# Patient Record
Sex: MALE | Race: WHITE | Employment: OTHER | URBAN - NONMETROPOLITAN AREA
[De-identification: names, ages, dates, MRNs, and addresses within clinical notes are randomized per-mention and may not be internally consistent; named-entity substitution may affect disease eponyms.]

---

## 2018-10-06 ENCOUNTER — HOSPITAL ENCOUNTER (EMERGENCY)
Age: 73
Discharge: HOME | End: 2018-10-06
Attending: EMERGENCY MEDICINE
Payer: MEDICARE

## 2018-10-06 ENCOUNTER — APPOINTMENT (OUTPATIENT)
Dept: GENERAL RADIOLOGY | Age: 73
End: 2018-10-06
Payer: MEDICARE

## 2018-10-06 ENCOUNTER — APPOINTMENT (OUTPATIENT)
Dept: CT IMAGING | Age: 73
End: 2018-10-06
Payer: MEDICARE

## 2018-10-06 VITALS
BODY MASS INDEX: 26.41 KG/M2 | WEIGHT: 195 LBS | OXYGEN SATURATION: 93 % | SYSTOLIC BLOOD PRESSURE: 125 MMHG | HEART RATE: 83 BPM | HEIGHT: 72 IN | RESPIRATION RATE: 20 BRPM | TEMPERATURE: 97.6 F | DIASTOLIC BLOOD PRESSURE: 81 MMHG

## 2018-10-06 DIAGNOSIS — R07.89 CHEST WALL PAIN: Primary | ICD-10-CM

## 2018-10-06 LAB
ALBUMIN SERPL-MCNC: 3.9 G/DL (ref 3.5–5.2)
ALP BLD-CCNC: 99 U/L (ref 40–130)
ALT SERPL-CCNC: 15 U/L (ref 5–41)
ANION GAP SERPL CALCULATED.3IONS-SCNC: 11 MMOL/L (ref 7–19)
APTT: 28.6 SEC (ref 26–36.2)
AST SERPL-CCNC: 17 U/L (ref 5–40)
BASOPHILS ABSOLUTE: 0.1 K/UL (ref 0–0.2)
BASOPHILS RELATIVE PERCENT: 0.6 % (ref 0–1)
BILIRUB SERPL-MCNC: 0.3 MG/DL (ref 0.2–1.2)
BUN BLDV-MCNC: 23 MG/DL (ref 8–23)
CALCIUM SERPL-MCNC: 9 MG/DL (ref 8.8–10.2)
CHLORIDE BLD-SCNC: 104 MMOL/L (ref 98–111)
CO2: 25 MMOL/L (ref 22–29)
CREAT SERPL-MCNC: 1.5 MG/DL (ref 0.5–1.2)
EOSINOPHILS ABSOLUTE: 0.4 K/UL (ref 0–0.6)
EOSINOPHILS RELATIVE PERCENT: 5 % (ref 0–5)
GFR NON-AFRICAN AMERICAN: 46
GLUCOSE BLD-MCNC: 115 MG/DL (ref 74–109)
HCT VFR BLD CALC: 34.4 % (ref 42–52)
HEMOGLOBIN: 11.3 G/DL (ref 14–18)
INR BLD: 1.02 (ref 0.88–1.18)
LYMPHOCYTES ABSOLUTE: 1.7 K/UL (ref 1.1–4.5)
LYMPHOCYTES RELATIVE PERCENT: 20.7 % (ref 20–40)
MCH RBC QN AUTO: 30.5 PG (ref 27–31)
MCHC RBC AUTO-ENTMCNC: 32.8 G/DL (ref 33–37)
MCV RBC AUTO: 92.7 FL (ref 80–94)
MONOCYTES ABSOLUTE: 0.8 K/UL (ref 0–0.9)
MONOCYTES RELATIVE PERCENT: 9.1 % (ref 0–10)
NEUTROPHILS ABSOLUTE: 5.4 K/UL (ref 1.5–7.5)
NEUTROPHILS RELATIVE PERCENT: 64.4 % (ref 50–65)
PDW BLD-RTO: 13.2 % (ref 11.5–14.5)
PLATELET # BLD: 156 K/UL (ref 130–400)
PMV BLD AUTO: 10.3 FL (ref 9.4–12.4)
POTASSIUM SERPL-SCNC: 4.6 MMOL/L (ref 3.5–5)
PROTHROMBIN TIME: 13.3 SEC (ref 12–14.6)
RBC # BLD: 3.71 M/UL (ref 4.7–6.1)
SODIUM BLD-SCNC: 140 MMOL/L (ref 136–145)
TOTAL PROTEIN: 6.8 G/DL (ref 6.6–8.7)
TROPONIN: <0.01 NG/ML (ref 0–0.03)
WBC # BLD: 8.3 K/UL (ref 4.8–10.8)

## 2018-10-06 PROCEDURE — 85610 PROTHROMBIN TIME: CPT

## 2018-10-06 PROCEDURE — 85025 COMPLETE CBC W/AUTO DIFF WBC: CPT

## 2018-10-06 PROCEDURE — 6360000002 HC RX W HCPCS: Performed by: EMERGENCY MEDICINE

## 2018-10-06 PROCEDURE — 36415 COLL VENOUS BLD VENIPUNCTURE: CPT

## 2018-10-06 PROCEDURE — 99285 EMERGENCY DEPT VISIT HI MDM: CPT | Performed by: EMERGENCY MEDICINE

## 2018-10-06 PROCEDURE — 80053 COMPREHEN METABOLIC PANEL: CPT

## 2018-10-06 PROCEDURE — 74150 CT ABDOMEN W/O CONTRAST: CPT

## 2018-10-06 PROCEDURE — 99285 EMERGENCY DEPT VISIT HI MDM: CPT

## 2018-10-06 PROCEDURE — 71045 X-RAY EXAM CHEST 1 VIEW: CPT

## 2018-10-06 PROCEDURE — 84484 ASSAY OF TROPONIN QUANT: CPT

## 2018-10-06 PROCEDURE — 85730 THROMBOPLASTIN TIME PARTIAL: CPT

## 2018-10-06 PROCEDURE — 93005 ELECTROCARDIOGRAM TRACING: CPT

## 2018-10-06 PROCEDURE — 96372 THER/PROPH/DIAG INJ SC/IM: CPT

## 2018-10-06 RX ORDER — ORPHENADRINE CITRATE 100 MG/1
100 TABLET, EXTENDED RELEASE ORAL 2 TIMES DAILY
Qty: 20 TABLET | Refills: 0 | Status: SHIPPED | OUTPATIENT
Start: 2018-10-06 | End: 2018-10-16

## 2018-10-06 RX ORDER — ORPHENADRINE CITRATE 30 MG/ML
60 INJECTION INTRAMUSCULAR; INTRAVENOUS ONCE
Status: COMPLETED | OUTPATIENT
Start: 2018-10-06 | End: 2018-10-06

## 2018-10-06 RX ADMIN — ORPHENADRINE CITRATE 60 MG: 30 INJECTION INTRAMUSCULAR; INTRAVENOUS at 21:25

## 2018-10-06 ASSESSMENT — ENCOUNTER SYMPTOMS
PHOTOPHOBIA: 0
CONSTIPATION: 0
CHEST TIGHTNESS: 0
SHORTNESS OF BREATH: 0
TROUBLE SWALLOWING: 0
NAUSEA: 0
DIARRHEA: 0
EYE PAIN: 0
SINUS PRESSURE: 0
WHEEZING: 0
BACK PAIN: 0
ABDOMINAL PAIN: 1
COLOR CHANGE: 0
VOMITING: 0

## 2018-10-06 ASSESSMENT — PAIN SCALES - GENERAL: PAINLEVEL_OUTOF10: 10

## 2018-10-06 ASSESSMENT — PAIN DESCRIPTION - LOCATION: LOCATION: CHEST;ABDOMEN

## 2018-10-06 ASSESSMENT — PAIN DESCRIPTION - PAIN TYPE: TYPE: ACUTE PAIN

## 2018-10-06 ASSESSMENT — PAIN DESCRIPTION - ORIENTATION: ORIENTATION: LEFT

## 2018-10-06 ASSESSMENT — PAIN DESCRIPTION - DESCRIPTORS: DESCRIPTORS: SHARP

## 2018-10-07 NOTE — ED PROVIDER NOTES
[facility]  eMERGENCY dEPARTMENT eNCOUnter      Pt Name: Jaime Ruffin  MRN: [id number]  [place] 1945  Date of evaluation: 10/6/2018  Provider: Anupama Mcconnell MD    [address] [place] [address]       Chief Complaint   Patient presents with    Abdominal Pain     Patient c/o LUQ abd pain, states started 30 minutes ago    Chest Pain         HISTORY OF PRESENT ILLNESS   (Location/Symptom, Timing/Onset, Context/Setting, Quality, Duration, Modifying Factors, Severity)  Note limiting factors. Jaime Ruffin is a 72 y.o. male who presents to the emergency department LUQ pain. Started around an hr to 2 hrs ago while he was watching TV. Describes the pain as sharp with some radiation into his left shoulder. Denies any n/v with the pain. No diarrhea or fever. Pain is 9/10. Took a regular ASA PTA w/o relief. HPI    Nursing Notes were reviewed. REVIEW OF SYSTEMS    (2-9 systems for level 4, 10 or more for level 5)     Review of Systems   Constitutional: Negative for fatigue and fever. HENT: Negative for congestion, drooling, sinus pressure and trouble swallowing. Eyes: Negative for photophobia, pain and visual disturbance. Respiratory: Negative for chest tightness, shortness of breath and wheezing. Cardiovascular: Positive for chest pain. Negative for palpitations and leg swelling. Gastrointestinal: Positive for abdominal pain (LUQ). Negative for constipation, diarrhea, nausea and vomiting. Genitourinary: Negative for dysuria, flank pain and urgency. Musculoskeletal: Negative for arthralgias, back pain, myalgias, neck pain and neck stiffness. Skin: Negative for color change, pallor and rash. Neurological: Negative for dizziness, facial asymmetry, speech difficulty, weakness, light-headedness, numbness and headaches. Psychiatric/Behavioral: Negative for agitation, confusion, hallucinations and suicidal ideas. The patient is not nervous/anxious.              PAST MEDICAL HISTORY     Past

## 2018-10-07 NOTE — ED NOTES
ASSESSMENT:    PT ALERT/ORIENTED X4. PUPILS EQUAL/REACTIVE    SKIN:  WARM/DRY PINK CAPILLARY REFILL < 2SECS    CARDIAC:  S1 S2 NOTED Pt c/o left sided sudden sharp pain. LUNGS: CLEAR UPPER AND LOWER LOBES, RESPIRATIONS EVEN/UNLABORED     ABDOMEN: Pt c/o LUQ pain    EXTREMITIES:  BILATERAL DP AND PT AND NO EDEMA NOTED. NO DISTRESS NOTED. SIDE RAILS UP AND CALL LIGHT IN REACH.      Jose Luis Stark RN  10/06/18 [id number]

## 2018-10-08 LAB
EKG P AXIS: -5 DEGREES
EKG P-R INTERVAL: 152 MS
EKG Q-T INTERVAL: 354 MS
EKG QRS DURATION: 82 MS
EKG QTC CALCULATION (BAZETT): 385 MS
EKG T AXIS: 41 DEGREES

## 2021-12-23 ENCOUNTER — TELEPHONE (OUTPATIENT)
Dept: UROLOGY | Facility: CLINIC | Age: 76
End: 2021-12-23

## 2022-02-02 ENCOUNTER — OFFICE VISIT (OUTPATIENT)
Dept: UROLOGY | Age: 77
End: 2022-02-02
Payer: MEDICARE

## 2022-02-02 VITALS — WEIGHT: 192.2 LBS | TEMPERATURE: 97.2 F | BODY MASS INDEX: 26.03 KG/M2 | HEIGHT: 72 IN

## 2022-02-02 DIAGNOSIS — Z87.442 HISTORY OF KIDNEY STONES: Primary | ICD-10-CM

## 2022-02-02 DIAGNOSIS — N28.9 KIDNEY FUNCTION ABNORMAL: ICD-10-CM

## 2022-02-02 LAB
APPEARANCE FLUID: CLEAR
BILIRUBIN, POC: NORMAL
BLOOD URINE, POC: NORMAL
CLARITY, POC: CLEAR
COLOR, POC: YELLOW
GLUCOSE URINE, POC: NORMAL
KETONES, POC: NORMAL
LEUKOCYTE EST, POC: NORMAL
NITRITE, POC: NORMAL
PH, POC: 5.5
PROTEIN, POC: NORMAL
SPECIFIC GRAVITY, POC: 1.02
UROBILINOGEN, POC: 0.2

## 2022-02-02 PROCEDURE — 99204 OFFICE O/P NEW MOD 45 MIN: CPT | Performed by: NURSE PRACTITIONER

## 2022-02-02 PROCEDURE — 81002 URINALYSIS NONAUTO W/O SCOPE: CPT | Performed by: NURSE PRACTITIONER

## 2022-02-02 RX ORDER — FERROUS SULFATE 325(65) MG
TABLET ORAL
COMMUNITY
Start: 2022-01-12

## 2022-02-02 RX ORDER — OMEPRAZOLE 40 MG/1
CAPSULE, DELAYED RELEASE ORAL
COMMUNITY
Start: 2022-01-04

## 2022-02-02 RX ORDER — MELOXICAM 15 MG/1
TABLET ORAL
COMMUNITY
Start: 2022-01-04

## 2022-02-02 ASSESSMENT — ENCOUNTER SYMPTOMS
VOMITING: 0
ABDOMINAL DISTENTION: 0
BACK PAIN: 0
NAUSEA: 0
ABDOMINAL PAIN: 0

## 2022-02-02 NOTE — PROGRESS NOTES
Uche Lugo is a 78 y.o. male who presents today   Chief Complaint   Patient presents with    New Patient     I am here today for kidney stones       Patient is a 44-year-old male presents to clinic today with complaints of worsening renal function. Patient has a known history of kidney stones and presented to the [facility] ED on 11/12/2021 where a CT was obtained. CT did reveal mild right hydroureteronephrosis secondary to a 5 mm calculus in the distal ureter there were also several nonobstructing stones in the left kidney although no ureteral stone in the left ureter. He was admitted for IV fluids. He denies ever passing the stone although his pain did dissipate therefore he decided to leave AMA prior to any intervention or repeat CT. He states that he has not had any further pain and does not believe he has any stones left. He continues to have worsening renal function and that is why he presents to the office today. CMP  12/22/21 Creatinine 1.40     GFR 52   05/19/21 Creatinine 1.43     GFR 51    Past Medical History:   Diagnosis Date    GERD (gastroesophageal reflux disease)     Hypertension        Past Surgical History:   Procedure Laterality Date    CHOLECYSTECTOMY      KNEE SURGERY         Current Outpatient Medications   Medication Sig Dispense Refill    ferrous sulfate (IRON 325) 325 (65 Fe) MG tablet TAKE 1 TABLET BY MOUTH EVERY DAY      omeprazole (PRILOSEC) 40 MG delayed release capsule TAKE 1 CAPSULE BY MOUTH EVERY MORNING 30 MINUTES BEFORE MORNING MEAL      meloxicam (MOBIC) 15 MG tablet TAKE 1 TABLET BY MOUTH EVERY DAY      Multiple Vitamin (MULTI-VITAMINS PO) Take by mouth      lisinopril (PRINIVIL;ZESTRIL) 10 MG tablet Take 10 mg by mouth daily       No current facility-administered medications for this visit.        No Known Allergies    Social History     Socioeconomic History    Marital status:      Spouse name: None    Number of children: None    Years of education: None    Highest education level: None   Occupational History    None   Tobacco Use    Smoking status: Former Smoker    Smokeless tobacco: Never Used   Vaping Use    Vaping Use: Never used   Substance and Sexual Activity    Alcohol use: No    Drug use: No    Sexual activity: None   Other Topics Concern    None   Social History Narrative    None     Social Determinants of Health     Financial Resource Strain:     Difficulty of Paying Living Expenses: Not on file   Food Insecurity:     Worried About Running Out of Food in the Last Year: Not on file    Vinicius of Food in the Last Year: Not on file   Transportation Needs:     Lack of Transportation (Medical): Not on file    Lack of Transportation (Non-Medical): Not on file   Physical Activity:     Days of Exercise per Week: Not on file    Minutes of Exercise per Session: Not on file   Stress:     Feeling of Stress : Not on file   Social Connections:     Frequency of Communication with Friends and Family: Not on file    Frequency of Social Gatherings with Friends and Family: Not on file    Attends Confucianist Services: Not on file    Active Member of [address] [place] [address] or Organizations: Not on file    Attends Club or Organization Meetings: Not on file    Marital Status: Not on file   Intimate Partner Violence:     Fear of Current or Ex-Partner: Not on file    Emotionally Abused: Not on file    Physically Abused: Not on file    Sexually Abused: Not on file   Housing Stability:     Unable to Pay for Housing in the Last Year: Not on file    Number of [place] in the Last Year: Not on file    Unstable Housing in the Last Year: Not on file       No family history on file. REVIEW OF SYSTEMS:  Review of Systems   Constitutional: Negative for chills and fever. Gastrointestinal: Negative for abdominal distention, abdominal pain, nausea and vomiting.    Genitourinary: Negative for difficulty urinating, dysuria, flank pain, frequency, hematuria and urgency. Musculoskeletal: Negative for back pain and gait problem. Psychiatric/Behavioral: Negative for agitation and confusion. PHYSICAL EXAM:  Temp 97.2 °F (36.2 °C) (Temporal)   Ht 6' (1.829 m)   Wt 192 lb 3.2 oz (87.2 kg)   BMI 26.07 kg/m²   Physical Exam  Vitals and nursing note reviewed. Constitutional:       General: He is not in acute distress. Appearance: Normal appearance. He is not ill-appearing. Pulmonary:      Effort: Pulmonary effort is normal. No respiratory distress. Abdominal:      General: There is no distension. Tenderness: There is no abdominal tenderness. There is no right CVA tenderness or left CVA tenderness. Neurological:      Mental Status: He is alert and oriented to person, place, and time. Mental status is at baseline. Psychiatric:         Mood and Affect: Mood normal.         Behavior: Behavior normal.       DATA:    Results for orders placed or performed in visit on 02/02/22   POCT Urinalysis no Micro   Result Value Ref Range    Color, UA YELLOW     Clarity, UA CLEAR     Glucose, UA POC 100mg/dL     Bilirubin, UA NEG     Ketones, UA NEG     Spec Grav, UA 1.025     Blood, UA POC NEG     pH, UA 5.5     Protein, UA POC NEG     Urobilinogen, UA 0.2     Leukocytes, UA NEG     Nitrite, UA NEG     Appearance, Fluid Clear Clear, Slightly Cloudy     IMAGING:  I did review the impression of the CT which did indicate a 5 mm stone obstructing in the right distal ureter with mild to moderate upstream hydroureteronephrosis as well as 5 left nonobstructing renal calculi. 1. History of kidney stones  Patient denies any flank pain whatsoever. He feels like he does not have any further obstructing stone since he is no longer having pain although never visualized him passing the stones. I have offered him a CT today to assess any further stone or stone burden for possible future intervention.   Patient states he has had 30+ years of stones and he does not want any elective intervention at this time nor does he want to ensure no further obstructing stone. He does feel that he has passed all of his stones since he has no further pain. He is also quite upset today that he did not get to see a urologist/physician therefore would like to follow-up with a urologist.  I did recommend a follow-up CT which she was okay with until he checked out today and decided he did not want to follow-up with the physician nor obtain CT.    - POCT Urinalysis no Micro  - CT ABDOMEN PELVIS WO CONTRAST Additional Contrast? None; Future    2. Kidney function abnormal  Worsening renal function likely baseline of creatinine 1.4 GFR 50. We will go ahead and send him to a nephrologist since that is what his chief complaint is today. - External Referral To Nephrology      Orders Placed This Encounter   Procedures    CT ABDOMEN PELVIS WO CONTRAST Additional Contrast? None     For renal stone protocol     Standing Status:   Future     Standing Expiration Date:   2/2/2023     Scheduling Instructions: For renal stone protocol     Order Specific Question:   Additional Contrast?     Answer:   None     Order Specific Question:   Reason for exam:     Answer:   renal colic for stone protocol    External Referral To Nephrology     Referral Priority:   Routine     Referral Type:   Eval and Treat     Referral Reason:   Specialty Services Required     Referred to Provider:   Patti Guidry MD     Requested Specialty:   Nephrology     Number of Visits Requested:   1    POCT Urinalysis no Micro        Return in about 6 months (around 8/2/2022) for with Dr. Tracy Kyle, follow up, CT prior to appt. .    At least 55 minutes were spent on the day of the visit reviewing the patient's past medical records/imaging, speaking face to face with the patient, and charting in the post visit period.     All information inputted into the note by the MA to include chief complaint, past medical history, past surgical history, medications, allergies, social and family history and review of systems has been reviewed and updated as needed by me. EMR Dragon/transcription disclaimer: Much of this documentt is electronic  transcription/translation of spoken language to printed text. The  electronic translation of spoken language may be erroneous, or at times,  nonsensical words or phrases may be inadvertently transcribed.  Although I  have reviewed the document for such errors, some may still exist.

## 2022-06-06 ENCOUNTER — APPOINTMENT (OUTPATIENT)
Dept: GENERAL RADIOLOGY | Facility: HOSPITAL | Age: 77
End: 2022-06-06

## 2022-06-06 ENCOUNTER — HOSPITAL ENCOUNTER (EMERGENCY)
Facility: HOSPITAL | Age: 77
Discharge: HOME | End: 2022-06-06
Admitting: EMERGENCY MEDICINE

## 2022-06-06 VITALS
RESPIRATION RATE: 20 BRPM | WEIGHT: 195 LBS | HEIGHT: 72 IN | TEMPERATURE: 99.8 F | BODY MASS INDEX: 26.41 KG/M2 | HEART RATE: 85 BPM | DIASTOLIC BLOOD PRESSURE: 75 MMHG | OXYGEN SATURATION: 94 % | SYSTOLIC BLOOD PRESSURE: 101 MMHG

## 2022-06-06 DIAGNOSIS — J18.9 PNEUMONIA OF LEFT LOWER LOBE DUE TO INFECTIOUS ORGANISM: Primary | ICD-10-CM

## 2022-06-06 LAB
ALBUMIN SERPL-MCNC: 4.1 G/DL (ref 3.5–5.2)
ALBUMIN/GLOB SERPL: 1.5 G/DL
ALP SERPL-CCNC: 105 U/L (ref 39–117)
ALT SERPL W P-5'-P-CCNC: 13 U/L (ref 1–41)
ANION GAP SERPL CALCULATED.3IONS-SCNC: 10 MMOL/L (ref 5–15)
AST SERPL-CCNC: 18 U/L (ref 1–40)
B PARAPERT DNA SPEC QL NAA+PROBE: NOT DETECTED
B PERT DNA SPEC QL NAA+PROBE: NOT DETECTED
BASOPHILS # BLD AUTO: 0.03 10*3/MM3 (ref 0–0.2)
BASOPHILS NFR BLD AUTO: 0.5 % (ref 0–1.5)
BILIRUB SERPL-MCNC: 0.4 MG/DL (ref 0–1.2)
BILIRUB UR QL STRIP: NEGATIVE
BUN SERPL-MCNC: 23 MG/DL (ref 8–23)
BUN/CREAT SERPL: 13.1 (ref 7–25)
C PNEUM DNA NPH QL NAA+NON-PROBE: NOT DETECTED
CALCIUM SPEC-SCNC: 9.2 MG/DL (ref 8.6–10.5)
CHLORIDE SERPL-SCNC: 104 MMOL/L (ref 98–107)
CLARITY UR: CLEAR
CO2 SERPL-SCNC: 23 MMOL/L (ref 22–29)
COLOR UR: YELLOW
CREAT SERPL-MCNC: 1.75 MG/DL (ref 0.76–1.27)
D-LACTATE SERPL-SCNC: 1 MMOL/L (ref 0.5–2)
DEPRECATED RDW RBC AUTO: 43.7 FL (ref 37–54)
EGFRCR SERPLBLD CKD-EPI 2021: 40.4 ML/MIN/1.73
EOSINOPHIL # BLD AUTO: 0.05 10*3/MM3 (ref 0–0.4)
EOSINOPHIL NFR BLD AUTO: 0.9 % (ref 0.3–6.2)
ERYTHROCYTE [DISTWIDTH] IN BLOOD BY AUTOMATED COUNT: 13.1 % (ref 12.3–15.4)
FLUAV SUBTYP SPEC NAA+PROBE: NOT DETECTED
FLUBV RNA ISLT QL NAA+PROBE: NOT DETECTED
GLOBULIN UR ELPH-MCNC: 2.7 GM/DL
GLUCOSE SERPL-MCNC: 109 MG/DL (ref 65–99)
GLUCOSE UR STRIP-MCNC: NEGATIVE MG/DL
HADV DNA SPEC NAA+PROBE: NOT DETECTED
HCOV 229E RNA SPEC QL NAA+PROBE: NOT DETECTED
HCOV HKU1 RNA SPEC QL NAA+PROBE: NOT DETECTED
HCOV NL63 RNA SPEC QL NAA+PROBE: NOT DETECTED
HCOV OC43 RNA SPEC QL NAA+PROBE: NOT DETECTED
HCT VFR BLD AUTO: 37.1 % (ref 37.5–51)
HETEROPH AB SER QL LA: NEGATIVE
HGB BLD-MCNC: 12.5 G/DL (ref 13–17.7)
HGB UR QL STRIP.AUTO: NEGATIVE
HMPV RNA NPH QL NAA+NON-PROBE: NOT DETECTED
HPIV1 RNA ISLT QL NAA+PROBE: NOT DETECTED
HPIV2 RNA SPEC QL NAA+PROBE: NOT DETECTED
HPIV3 RNA NPH QL NAA+PROBE: NOT DETECTED
HPIV4 P GENE NPH QL NAA+PROBE: NOT DETECTED
IMM GRANULOCYTES # BLD AUTO: 0.02 10*3/MM3 (ref 0–0.05)
IMM GRANULOCYTES NFR BLD AUTO: 0.3 % (ref 0–0.5)
KETONES UR QL STRIP: NEGATIVE
LEUKOCYTE ESTERASE UR QL STRIP.AUTO: NEGATIVE
LYMPHOCYTES # BLD AUTO: 0.5 10*3/MM3 (ref 0.7–3.1)
LYMPHOCYTES NFR BLD AUTO: 8.7 % (ref 19.6–45.3)
M PNEUMO IGG SER IA-ACNC: NOT DETECTED
MCH RBC QN AUTO: 30.6 PG (ref 26.6–33)
MCHC RBC AUTO-ENTMCNC: 33.7 G/DL (ref 31.5–35.7)
MCV RBC AUTO: 90.9 FL (ref 79–97)
MONOCYTES # BLD AUTO: 0.67 10*3/MM3 (ref 0.1–0.9)
MONOCYTES NFR BLD AUTO: 11.6 % (ref 5–12)
NEUTROPHILS NFR BLD AUTO: 4.5 10*3/MM3 (ref 1.7–7)
NEUTROPHILS NFR BLD AUTO: 78 % (ref 42.7–76)
NITRITE UR QL STRIP: NEGATIVE
NRBC BLD AUTO-RTO: 0 /100 WBC (ref 0–0.2)
PH UR STRIP.AUTO: 5.5 [PH] (ref 5–8)
PLATELET # BLD AUTO: 132 10*3/MM3 (ref 140–450)
PMV BLD AUTO: 10.8 FL (ref 6–12)
POTASSIUM SERPL-SCNC: 4.4 MMOL/L (ref 3.5–5.2)
PROCALCITONIN SERPL-MCNC: 0.09 NG/ML (ref 0–0.25)
PROT SERPL-MCNC: 6.8 G/DL (ref 6–8.5)
PROT UR QL STRIP: NEGATIVE
RBC # BLD AUTO: 4.08 10*6/MM3 (ref 4.14–5.8)
RHINOVIRUS RNA SPEC NAA+PROBE: NOT DETECTED
RSV RNA NPH QL NAA+NON-PROBE: NOT DETECTED
S PYO AG THROAT QL: NEGATIVE
SARS-COV-2 RNA NPH QL NAA+NON-PROBE: NOT DETECTED
SODIUM SERPL-SCNC: 137 MMOL/L (ref 136–145)
SP GR UR STRIP: 1.02 (ref 1–1.03)
T3FREE SERPL-MCNC: 2.26 PG/ML (ref 2–4.4)
T4 FREE SERPL-MCNC: 0.93 NG/DL (ref 0.93–1.7)
TROPONIN T SERPL-MCNC: <0.01 NG/ML (ref 0–0.03)
TSH SERPL DL<=0.05 MIU/L-ACNC: 0.51 UIU/ML (ref 0.27–4.2)
UROBILINOGEN UR QL STRIP: NORMAL
WBC NRBC COR # BLD: 5.77 10*3/MM3 (ref 3.4–10.8)

## 2022-06-06 PROCEDURE — 96361 HYDRATE IV INFUSION ADD-ON: CPT

## 2022-06-06 PROCEDURE — 71045 X-RAY EXAM CHEST 1 VIEW: CPT

## 2022-06-06 PROCEDURE — 0202U NFCT DS 22 TRGT SARS-COV-2: CPT | Performed by: PHYSICIAN ASSISTANT

## 2022-06-06 PROCEDURE — 93005 ELECTROCARDIOGRAM TRACING: CPT | Performed by: PHYSICIAN ASSISTANT

## 2022-06-06 PROCEDURE — 80053 COMPREHEN METABOLIC PANEL: CPT | Performed by: PHYSICIAN ASSISTANT

## 2022-06-06 PROCEDURE — 87081 CULTURE SCREEN ONLY: CPT | Performed by: PHYSICIAN ASSISTANT

## 2022-06-06 PROCEDURE — 86308 HETEROPHILE ANTIBODY SCREEN: CPT | Performed by: PHYSICIAN ASSISTANT

## 2022-06-06 PROCEDURE — 84484 ASSAY OF TROPONIN QUANT: CPT | Performed by: PHYSICIAN ASSISTANT

## 2022-06-06 PROCEDURE — 93010 ELECTROCARDIOGRAM REPORT: CPT | Performed by: INTERNAL MEDICINE

## 2022-06-06 PROCEDURE — 84439 ASSAY OF FREE THYROXINE: CPT | Performed by: PHYSICIAN ASSISTANT

## 2022-06-06 PROCEDURE — 87880 STREP A ASSAY W/OPTIC: CPT | Performed by: PHYSICIAN ASSISTANT

## 2022-06-06 PROCEDURE — 99284 EMERGENCY DEPT VISIT MOD MDM: CPT

## 2022-06-06 PROCEDURE — 81003 URINALYSIS AUTO W/O SCOPE: CPT | Performed by: PHYSICIAN ASSISTANT

## 2022-06-06 PROCEDURE — 84481 FREE ASSAY (FT-3): CPT | Performed by: PHYSICIAN ASSISTANT

## 2022-06-06 PROCEDURE — 87040 BLOOD CULTURE FOR BACTERIA: CPT | Performed by: PHYSICIAN ASSISTANT

## 2022-06-06 PROCEDURE — 84145 PROCALCITONIN (PCT): CPT | Performed by: PHYSICIAN ASSISTANT

## 2022-06-06 PROCEDURE — 84443 ASSAY THYROID STIM HORMONE: CPT | Performed by: PHYSICIAN ASSISTANT

## 2022-06-06 PROCEDURE — 36415 COLL VENOUS BLD VENIPUNCTURE: CPT

## 2022-06-06 PROCEDURE — 96360 HYDRATION IV INFUSION INIT: CPT

## 2022-06-06 PROCEDURE — 83605 ASSAY OF LACTIC ACID: CPT | Performed by: PHYSICIAN ASSISTANT

## 2022-06-06 PROCEDURE — 85025 COMPLETE CBC W/AUTO DIFF WBC: CPT | Performed by: PHYSICIAN ASSISTANT

## 2022-06-06 RX ORDER — CLOPIDOGREL BISULFATE 75 MG/1
75 TABLET ORAL DAILY
COMMUNITY

## 2022-06-06 RX ORDER — MELOXICAM 15 MG/1
15 TABLET ORAL DAILY
COMMUNITY

## 2022-06-06 RX ORDER — ASPIRIN 81 MG/1
81 TABLET ORAL DAILY
COMMUNITY

## 2022-06-06 RX ORDER — FERROUS SULFATE 325(65) MG
325 TABLET ORAL
COMMUNITY

## 2022-06-06 RX ORDER — SODIUM CHLORIDE, SODIUM LACTATE, POTASSIUM CHLORIDE, CALCIUM CHLORIDE 600; 310; 30; 20 MG/100ML; MG/100ML; MG/100ML; MG/100ML
125 INJECTION, SOLUTION INTRAVENOUS CONTINUOUS
Status: DISCONTINUED | OUTPATIENT
Start: 2022-06-06 | End: 2022-06-06 | Stop reason: HOSPADM

## 2022-06-06 RX ORDER — DOXYCYCLINE 100 MG/1
100 CAPSULE ORAL 2 TIMES DAILY
Qty: 14 CAPSULE | Refills: 0 | Status: SHIPPED | OUTPATIENT
Start: 2022-06-06

## 2022-06-06 RX ORDER — LISINOPRIL 10 MG/1
10 TABLET ORAL DAILY
COMMUNITY

## 2022-06-06 RX ORDER — MULTIPLE VITAMINS W/ MINERALS TAB 9MG-400MCG
1 TAB ORAL DAILY
COMMUNITY

## 2022-06-06 RX ORDER — OMEPRAZOLE 20 MG/1
20 CAPSULE, DELAYED RELEASE ORAL DAILY
COMMUNITY

## 2022-06-06 RX ORDER — ALLOPURINOL 100 MG/1
100 TABLET ORAL DAILY
COMMUNITY

## 2022-06-06 RX ORDER — SACCHAROMYCES BOULARDII 250 MG
250 CAPSULE ORAL 2 TIMES DAILY
Qty: 14 CAPSULE | Refills: 0 | Status: SHIPPED | OUTPATIENT
Start: 2022-06-06

## 2022-06-06 RX ORDER — ACETAMINOPHEN 500 MG
1000 TABLET ORAL ONCE
Status: COMPLETED | OUTPATIENT
Start: 2022-06-06 | End: 2022-06-06

## 2022-06-06 RX ORDER — AZITHROMYCIN 250 MG/1
250 TABLET, FILM COATED ORAL DAILY
Qty: 6 TABLET | Refills: 0 | Status: SHIPPED | OUTPATIENT
Start: 2022-06-06

## 2022-06-06 RX ADMIN — SODIUM CHLORIDE, POTASSIUM CHLORIDE, SODIUM LACTATE AND CALCIUM CHLORIDE 500 ML: 600; 310; 30; 20 INJECTION, SOLUTION INTRAVENOUS at 09:36

## 2022-06-06 RX ADMIN — SODIUM CHLORIDE, POTASSIUM CHLORIDE, SODIUM LACTATE AND CALCIUM CHLORIDE 125 ML/HR: 600; 310; 30; 20 INJECTION, SOLUTION INTRAVENOUS at 10:52

## 2022-06-06 RX ADMIN — ACETAMINOPHEN 1000 MG: 500 TABLET ORAL at 11:56

## 2022-06-06 NOTE — ED PROVIDER NOTES
Subjective   History of Present Illness    Patient is a pleasant 76-year-old gentleman who presents to ED with wife.  Both are historians.  Chief complaint is fever and not feeling well.  The patient describes his symptoms began yesterday morning.  The day before, he was out about yard-saling without any difficulty.  Yesterday, he felt unwell but continued to proceed with his normal activity.  He did go to [employer].  He was eating per usual but just had generalized body aches.  He describes that he recently got over respiratory infection and sinus pressure this previous week.  He completed antibiotics on Friday.  He denies any new cough or congestion.  He denies any further respiratory symptoms.  The patient denies any changes in bowel movement.  He denies dysuria, hematuria, or flank pain.  This morning, he felt significantly worse.  He spiked a temp of 101.  He was given a baby aspirin.  He denies any specific chest pain but does complain of generalized myalgias and arthralgias.  He did have a stent placed 2 months ago and denies any similar symptoms.  He denies any associated abdominal pain, neck pain, rash, sick exposure.  He did take a COVID test this morning which was negative.  He is concerned he may have flu.  Wife reports that he was so weak they fell over several times this morning without hurting himself.    Review of Systems   Constitutional: Positive for activity change, fatigue and fever. Negative for appetite change.   HENT: Negative.    Respiratory: Negative.  Negative for cough, chest tightness and shortness of breath.    Cardiovascular: Negative.  Negative for chest pain.   Gastrointestinal: Negative for abdominal pain.   Genitourinary: Negative.    Musculoskeletal: Negative.    Skin: Negative.    Neurological: Negative.    Psychiatric/Behavioral: Negative.    All other systems reviewed and are negative.      Past Medical History:   Diagnosis Date   • Coronary artery disease    • GERD  "(gastroesophageal reflux disease)    • Hypertension    • Renal disorder        No Known Allergies    Past Surgical History:   Procedure Laterality Date   • BACK SURGERY     • CHOLECYSTECTOMY     • CORONARY STENT PLACEMENT      X3   • REPLACEMENT TOTAL KNEE Left        History reviewed. No pertinent family history.    Social History     Socioeconomic History   • Marital status:    Tobacco Use   • Smoking status: Former Smoker   • Smokeless tobacco: Former User     Types: Chew   Substance and Sexual Activity   • Alcohol use: Never       Prior to Admission medications    Not on File       Medications   lactated ringers infusion (125 mL/hr Intravenous New Bag 6/6/22 1052)   lactated ringers bolus 500 mL (0 mL Intravenous Stopped 6/6/22 1054)   acetaminophen (TYLENOL) tablet 1,000 mg (1,000 mg Oral Given 6/6/22 1156)       /75 (BP Location: Right arm, Patient Position: Lying)   Pulse 84   Temp (!) 101.2 °F (38.4 °C) (Oral)   Resp 18   Ht 182.9 cm (72\")   Wt 88.5 kg (195 lb)   SpO2 93%   BMI 26.45 kg/m²       Objective   Physical Exam  Vitals reviewed.   Constitutional:       Appearance: Normal appearance. He is well-developed.   HENT:      Head: Normocephalic and atraumatic.      Right Ear: External ear normal.      Left Ear: External ear normal.      Nose: Nose normal.   Eyes:      Conjunctiva/sclera: Conjunctivae normal.      Pupils: Pupils are equal, round, and reactive to light.   Neck:      Trachea: No tracheal deviation.   Cardiovascular:      Rate and Rhythm: Normal rate and regular rhythm.      Heart sounds: Normal heart sounds. No murmur heard.    No friction rub. No gallop.   Pulmonary:      Effort: Pulmonary effort is normal. No respiratory distress.      Breath sounds: Normal breath sounds. No wheezing or rales.   Chest:      Chest wall: No tenderness.   Abdominal:      General: Bowel sounds are normal. There is no distension.      Palpations: Abdomen is soft. There is no mass.      " Tenderness: There is no abdominal tenderness. There is no guarding or rebound.   Musculoskeletal:         General: No tenderness or deformity. Normal range of motion.      Cervical back: Normal range of motion and neck supple.   Skin:     General: Skin is warm and dry.      Coloration: Skin is not pale.      Findings: No erythema or rash.   Neurological:      General: No focal deficit present.      Mental Status: He is alert and oriented to person, place, and time.      Sensory: No sensory deficit.      Motor: No weakness.      Comments: Cranial nerve evaluation:  No aphasia.  PERRLA. Normal visual fields. Normal smooth eye movement.   No facial assymetry.  Tongue is midline with normal gag reflex.   Symmetrical/normal sternocleidomastoid movement.   No pronator drift.  No sensory deficits.  No motor deficits.   No ataxia.    Psychiatric:         Mood and Affect: Mood normal.         Behavior: Behavior normal.         Thought Content: Thought content normal.         Judgment: Judgment normal.         Procedures         Lab Results (last 24 hours)     Procedure Component Value Units Date/Time    CBC & Differential [[id number]]  (Abnormal) Collected: 06/06/22 0930    Specimen: Blood Updated: 06/06/22 1003    Narrative:      The following orders were created for panel order CBC & Differential.  Procedure                               Abnormality         Status                     ---------                               -----------         ------                     CBC Auto Differential[[id number]]        Abnormal            Final result                 Please view results for these tests on the individual orders.    Comprehensive Metabolic Panel [[id number]]  (Abnormal) Collected: 06/06/22 0930    Specimen: Blood Updated: 06/06/22 1033     Glucose 109 mg/dL      BUN 23 mg/dL      Creatinine 1.75 mg/dL      Sodium 137 mmol/L      Potassium 4.4 mmol/L      Chloride 104 mmol/L      CO2 23.0 mmol/L      Calcium 9.2 mg/dL       Total Protein 6.8 g/dL      Albumin 4.10 g/dL      ALT (SGPT) 13 U/L      AST (SGOT) 18 U/L      Alkaline Phosphatase 105 U/L      Total Bilirubin 0.4 mg/dL      Globulin 2.7 gm/dL      A/G Ratio 1.5 g/dL      BUN/Creatinine Ratio 13.1     Anion Gap 10.0 mmol/L      eGFR 40.4 mL/min/1.73      Comment: National Kidney Foundation and American Society of Nephrology (ASN) Task Force recommended calculation based on the Chronic Kidney Disease Epidemiology Collaboration (CKD-EPI) equation refit without adjustment for race.       Narrative:      GFR Normal >60  Chronic Kidney Disease <60  Kidney Failure <15      Mononucleosis Screen [[id number]]  (Normal) Collected: 06/06/22 0930    Specimen: Blood Updated: 06/06/22 1007     Monospot Negative    Troponin [[id number]]  (Normal) Collected: 06/06/22 0930    Specimen: Blood Updated: 06/06/22 1029     Troponin T <0.010 ng/mL     Narrative:      Troponin T Reference Range:  <= 0.03 ng/mL-   Negative for AMI  >0.03 ng/mL-     Abnormal for myocardial necrosis.  Clinicians would have to utilize clinical acumen, EKG, Troponin and serial changes to determine if it is an Acute Myocardial Infarction or myocardial injury due to an underlying chronic condition.       Results may be falsely decreased if patient taking Biotin.      Blood Culture - Blood, Arm, Left [[id number]] Collected: 06/06/22 0930    Specimen: Blood from Arm, Left Updated: 06/06/22 1020    Blood Culture - Blood, Arm, Right [[id number]] Collected: 06/06/22 0930    Specimen: Blood from Arm, Right Updated: 06/06/22 1020    Lactic Acid, Plasma [[id number]]  (Normal) Collected: 06/06/22 0930    Specimen: Blood Updated: 06/06/22 1018     Lactate 1.0 mmol/L     Procalcitonin [[id number]]  (Normal) Collected: 06/06/22 0930    Specimen: Blood Updated: 06/06/22 1036     Procalcitonin 0.09 ng/mL     Narrative:      As a Marker for Sepsis (Non-Neonates):    1. <0.5 ng/mL represents a low risk of severe sepsis and/or septic shock.  2.  ">2 ng/mL represents a high risk of severe sepsis and/or septic shock.    As a Marker for Lower Respiratory Tract Infections that require antibiotic therapy:    PCT on Admission    Antibiotic Therapy       6-12 Hrs later    >0.5                Strongly Recommended  >0.25 - <0.5        Recommended   0.1 - 0.25          Discouraged              Remeasure/reassess PCT  <0.1                Strongly Discouraged     Remeasure/reassess PCT    As 28 day mortality risk marker: \"Change in Procalcitonin Result\" (>80% or <=80%) if Day 0 (or Day 1) and Day 4 values are available. Refer to http://[email or web address]-[email or web address]-[email or web address]    Change in PCT <=80%  A decrease of PCT levels below or equal to 80% defines a positive change in PCT test result representing a higher risk for 28-day all-cause mortality of patients diagnosed with severe sepsis for septic shock.    Change in PCT >80%  A decrease of PCT levels of more than 80% defines a negative change in PCT result representing a lower risk for 28-day all-cause mortality of patients diagnosed with severe sepsis or septic shock.       TSH [[id number]]  (Normal) Collected: 06/06/22 0930    Specimen: Blood Updated: 06/06/22 1036     TSH 0.511 uIU/mL     T4, Free [[id number]]  (Normal) Collected: 06/06/22 0930    Specimen: Blood Updated: 06/06/22 1035     Free T4 0.93 ng/dL     Narrative:      Results may be falsely increased if patient taking Biotin.      T3, Free [[id number]] Collected: 06/06/22 0930    Specimen: Blood Updated: 06/06/22 0953    CBC Auto Differential [[id number]]  (Abnormal) Collected: 06/06/22 0930    Specimen: Blood Updated: 06/06/22 1003     WBC 5.77 10*3/mm3      RBC 4.08 10*6/mm3      Hemoglobin 12.5 g/dL      Hematocrit 37.1 %      MCV 90.9 fL      MCH 30.6 pg      MCHC 33.7 g/dL      RDW 13.1 %      RDW-SD 43.7 fl      MPV 10.8 fL      Platelets 132 10*3/mm3      Neutrophil % 78.0 %      Lymphocyte % 8.7 %      Monocyte % 11.6 %      Eosinophil % 0.9 %      Basophil % 0.5 " %      Immature Grans % 0.3 %      Neutrophils, Absolute 4.50 10*3/mm3      Lymphocytes, Absolute 0.50 10*3/mm3      Monocytes, Absolute 0.67 10*3/mm3      Eosinophils, Absolute 0.05 10*3/mm3      Basophils, Absolute 0.03 10*3/mm3      Immature Grans, Absolute 0.02 10*3/mm3      nRBC 0.0 /100 WBC     Respiratory Panel PCR w/COVID-19(SARS-CoV-2) [facility]ESU/NAIMA/BRENNEN/PAD/COR/MAD/CHEYENNE In-House, NP Swab in UTM/VTM, 3-4 HR TAT - Swab, Nasopharynx [[id number]]  (Normal) Collected: 06/06/22 0934    Specimen: Swab from Nasopharynx Updated: 06/06/22 1106     ADENOVIRUS, PCR Not Detected     Coronavirus 229E Not Detected     Coronavirus HKU1 Not Detected     Coronavirus NL63 Not Detected     Coronavirus OC43 Not Detected     COVID19 Not Detected     Human Metapneumovirus Not Detected     Human Rhinovirus/Enterovirus Not Detected     Influenza A PCR Not Detected     Influenza B PCR Not Detected     Parainfluenza Virus 1 Not Detected     Parainfluenza Virus 2 Not Detected     Parainfluenza Virus 3 Not Detected     Parainfluenza Virus 4 Not Detected     RSV, PCR Not Detected     Bordetella pertussis pcr Not Detected     Bordetella parapertussis PCR Not Detected     Chlamydophila pneumoniae PCR Not Detected     Mycoplasma pneumo by PCR Not Detected    Narrative:      In the setting of a positive respiratory panel with a viral infection PLUS a negative procalcitonin without other underlying concern for bacterial infection, consider observing off antibiotics or discontinuation of antibiotics and continue supportive care. If the respiratory panel is positive for atypical bacterial infection (Bordetella pertussis, Chlamydophila pneumoniae, or Mycoplasma pneumoniae), consider antibiotic de-escalation to target atypical bacterial infection.    Rapid Strep A Screen - Swab, Throat [[id number]]  (Normal) Collected: 06/06/22 0934    Specimen: Swab from Throat Updated: 06/06/22 1025     Strep A Ag Negative    Beta Strep Culture, Throat - Swab, Throat  [[id number]] Collected: 06/06/22 0934    Specimen: Swab from Throat Updated: 06/06/22 1025    Urinalysis With Culture If Indicated - Urine, Clean Catch [[id number]]  (Normal) Collected: 06/06/22 1028    Specimen: Urine, Clean Catch Updated: 06/06/22 1056     Color, UA Yellow     Appearance, UA Clear     pH, UA 5.5     Specific Gravity, UA 1.020     Glucose, UA Negative     Ketones, UA Negative     Bilirubin, UA Negative     Blood, UA Negative     Protein, UA Negative     Leuk Esterase, UA Negative     Nitrite, UA Negative     Urobilinogen, UA 0.2 E.U./dL    Narrative:      In absence of clinical symptoms, the presence of pyuria, bacteria, and/or nitrites on the urinalysis result does not correlate with infection.  Urine microscopic not indicated.          XR Chest 1 View    Result Date: 6/6/2022  Narrative: EXAMINATION:  XR CHEST 1 VW-  6/6/2022 9:32 AM CDT  HISTORY: Fever.  COMPARISON: No comparison study.  TECHNIQUE: Single view AP image.  FINDINGS:  The inspiration is not very deep. There is patchy linear infiltrate at the left lung base. There is minimal blunting of the left costophrenic angle. There is mild bronchial wall thickening. Heart size is normal. There are degenerative changes of the shoulders left greater than right. No acute bony abnormality is seen.      Impression: 1. Hypoventilation with vascular crowding. 2. Linear patchy infiltrate along the left hemidiaphragm, likely atelectasis. Pneumonia less likely. Minimal blunting of the left costophrenic angle.   This report was finalized on 06/06/2022 09:37 by Dr. Devan Burks MD.      ED Course  ED Course as of 06/06/22 1335   Mon Jun 06, 2022   1331 Patient has been reassessed on several occasions.  Dr. Siddiqui had spoken to the patient about his unremarkable studies at this time.  Chest x-ray did show questionable possible pneumonia in which we will treat.  He does feel comfortable be discharged.  He is aware that they still possibly could have COVID-19  and advised to recheck while symptomatic in the next several days.  Patient voiced understanding.  Recommend quarantine.  Recommend follow-up primary care provider tomorrow.  Strict return precaution advised.  Patient will be discharged in stable condition. [TK]      ED Course User Index  [TK] Dimple Cevallos PA          [facility]    Final diagnoses:   Pneumonia of left lower lobe due to infectious organism          Dimple Cevallos PA  06/06/22 7051

## 2022-06-06 NOTE — ED TRIAGE NOTES
Patient states around 0200 Sunday morning he started having body aches and general illness. Patient states he has body aches and hurts all over, difficulty urinating but has already being treated by nephrology for that issue. Patient states he has had fever but took a covid test this morning and tested negative. Patient fell this morning due to weakness, denies dizziness or syncope. Denies LOC.

## 2022-06-08 LAB
BACTERIA SPEC AEROBE CULT: NORMAL
QT INTERVAL: 364 MS
QTC INTERVAL: 455 MS

## 2022-06-11 LAB
BACTERIA SPEC AEROBE CULT: NORMAL
BACTERIA SPEC AEROBE CULT: NORMAL

## 2024-04-12 NOTE — PROGRESS NOTES
Subjective    Mr. Lazar is 78 y.o. male    Chief Complaint: Prostate Pain     History of Present Illness  Patient is a 78-year-old gentleman who is referred for according to the note for burning or discomfort with urinating, blood in the semen and frequency.  Patient states he is really not having burning with urination it is more rectal pressure patient states it feels like his prostate is inflamed and swollen and pelvic pain.  Also for last 2 to 3 weeks he has noticed blood in the semen which is correlated with the symptoms he is having.  Denies any new or worsening voiding symptoms his stream and flow are strong no issues with emptying his bladder no gross hematuria.  No fever or chills.  Most recent PSA done March 2024 2.27.    The following portions of the patient's history were reviewed and updated as appropriate: allergies, current medications, past family history, past medical history, past social history, past surgical history and problem list.    Review of Systems   Genitourinary:  Positive for frequency and urgency.        Blood in the semen         Current Outpatient Medications:     allopurinol (ZYLOPRIM) 100 MG tablet, Take 1 tablet by mouth Daily., Disp: , Rfl:     apixaban (Eliquis) 5 MG tablet tablet, Every 12 (Twelve) Hours., Disp: , Rfl:     aspirin 81 MG EC tablet, Take 1 tablet by mouth Daily., Disp: , Rfl:     ferrous sulfate 325 (65 FE) MG tablet, Take 1 tablet by mouth Daily With Breakfast., Disp: , Rfl:     lisinopril (PRINIVIL,ZESTRIL) 10 MG tablet, Take 1 tablet by mouth Daily., Disp: , Rfl:     metoprolol succinate XL (TOPROL-XL) 25 MG 24 hr tablet, metoprolol succinate ER 25 mg tablet,extended release 24 hr  TAKE 1 TABLET BY MOUTH EVERY DAY, Disp: , Rfl:     multivitamin with minerals (CENTRUM SILVER 50+MEN PO), Take 1 tablet by mouth Daily., Disp: , Rfl:     nitroglycerin (NITROSTAT) 0.4 MG SL tablet, nitroglycerin 0.4 mg sublingual tablet  PLACE 1 TABLET UNDER TONGUE EVERY 5 MINS, UP  "TO 3 DOSES AS NEEDED FOR CHEST PAIN, Disp: , Rfl:     omeprazole (priLOSEC) 20 MG capsule, Take 1 capsule by mouth Daily., Disp: , Rfl:     azithromycin (ZITHROMAX) 250 MG tablet, Take 1 tablet by mouth Daily. (Patient not taking: Reported on 4/24/2024), Disp: 6 tablet, Rfl: 0    clopidogrel (PLAVIX) 75 MG tablet, Take 75 mg by mouth Daily. (Patient not taking: Reported on 4/24/2024), Disp: , Rfl:     doxycycline (MONODOX) 100 MG capsule, Take 1 capsule by mouth 2 (Two) Times a Day. (Patient not taking: Reported on 4/24/2024), Disp: 14 capsule, Rfl: 0    meloxicam (MOBIC) 15 MG tablet, Take 15 mg by mouth Daily. (Patient not taking: Reported on 4/24/2024), Disp: , Rfl:     metoprolol tartrate (LOPRESSOR) 25 MG tablet, Take 25 mg by mouth 2 (Two) Times a Day. (Patient not taking: Reported on 4/24/2024), Disp: , Rfl:     saccharomyces boulardii (FLORASTOR) 250 MG capsule, Take 1 capsule by mouth 2 (Two) Times a Day. (Patient not taking: Reported on 4/24/2024), Disp: 14 capsule, Rfl: 0    sulfamethoxazole-trimethoprim (Bactrim DS) 800-160 MG per tablet, Take 1 tablet by mouth 2 (Two) Times a Day for 28 days., Disp: 56 tablet, Rfl: 0    tamsulosin (FLOMAX) 0.4 MG capsule 24 hr capsule, Take 1 capsule by mouth Every Night for 90 days., Disp: 30 capsule, Rfl: 2    Past Medical History:   Diagnosis Date    Coronary artery disease     GERD (gastroesophageal reflux disease)     Hypertension     Renal disorder        Past Surgical History:   Procedure Laterality Date    BACK SURGERY      CHOLECYSTECTOMY      CORONARY STENT PLACEMENT      X3    REPLACEMENT TOTAL KNEE Left        Social History     Socioeconomic History    Marital status:    Tobacco Use    Smoking status: Former    Smokeless tobacco: Former     Types: Chew   Substance and Sexual Activity    Alcohol use: Never       History reviewed. No pertinent family history.    Objective    Temp 97.6 °F (36.4 °C)   Ht 182.9 cm (72\")   Wt 87.3 kg (192 lb 6.4 oz)   " BMI 26.09 kg/m²     Physical Exam  Constitutional:       Appearance: Normal appearance.   HENT:      Head: Normocephalic and atraumatic.   Pulmonary:      Effort: Pulmonary effort is normal.   Genitourinary:     Comments: Left upper pole prostate nodule otherwise smooth symmetric no suspicious lesions nodules asymmetry of firmness palpated.  Skin:     Coloration: Skin is not pale.   Neurological:      Mental Status: He is alert.   Psychiatric:         Mood and Affect: Mood normal.         Behavior: Behavior normal.             Results for orders placed or performed in visit on 04/24/24   POC Urinalysis Dipstick, Multipro    Specimen: Urine   Result Value Ref Range    Color Yellow Yellow, Straw, Dark Yellow, Maegan    Clarity, UA Clear Clear    Glucose, UA Negative Negative mg/dL    Bilirubin Negative Negative    Ketones, UA Negative Negative    Specific Gravity  1.025 1.005 - 1.030    Blood, UA Negative Negative    pH, Urine 5.5 5.0 - 8.0    Protein, POC Negative Negative mg/dL    Urobilinogen, UA 0.2 E.U./dL Normal, 0.2 E.U./dL    Nitrite, UA Negative Negative    Leukocytes Negative Negative     IPSS Questionnaire (AUA-7):  Incomplete emptying  Over the past month, how often have you had a sensation of not emptying your bladder completely after you finished urinating?: About half the time (04/24/24 0938)  Frequency  Over the past month, how often have you had to urinate again less than two hours after you finishied urinating ?: More than half the time (04/24/24 0938)  Intermittency  Over the past month, how often have you found you stopped and started again several time when you urinated ?: Less than 1 time in 5 (04/24/24 0938)  Urgency  Over the last month, how often have you found it difficult  have you found it difficult to postpone urination ?: About half the time (04/24/24 0938)  Weak Stream  Over the past month, how often have you had a weak urinary stream ?: About half the time (04/24/24  0938)  Straining  Over the past month, how often have you had to push or strain to begin urination ?: About half the time (04/24/24 0938)  Nocturia  Over the past month, how many times did you most typically get up to urinate from the time you went to bed until the time you got up in the morning ?: 2 times (04/24/24 0938)  Quality of life due to urinary symptoms  If you were to spend the rest of your life with your urinary condition the way it is now, how would feel about that?: Mixed - about equally satisfied (04/24/24 0938)    Assessment and Plan    Diagnoses and all orders for this visit:    1. Prostatitis, unspecified prostatitis type (Primary)  -     POC Urinalysis Dipstick, Multipro  -     sulfamethoxazole-trimethoprim (Bactrim DS) 800-160 MG per tablet; Take 1 tablet by mouth 2 (Two) Times a Day for 28 days.  Dispense: 56 tablet; Refill: 0  -     tamsulosin (FLOMAX) 0.4 MG capsule 24 hr capsule; Take 1 capsule by mouth Every Night for 90 days.  Dispense: 30 capsule; Refill: 2    2. Hematospermia  -     sulfamethoxazole-trimethoprim (Bactrim DS) 800-160 MG per tablet; Take 1 tablet by mouth 2 (Two) Times a Day for 28 days.  Dispense: 56 tablet; Refill: 0  -     tamsulosin (FLOMAX) 0.4 MG capsule 24 hr capsule; Take 1 capsule by mouth Every Night for 90 days.  Dispense: 30 capsule; Refill: 2      Patient's had a few symptoms occurring last 2 to 3 weeks she describes pelvic pressure if sensation of inflamed or swollen prostate no burning with urination no blood in the urine although he has had blood in the semen since the symptoms have recurred.  Digital rectal exam did reveal a mildly tender prostate there is a prostate nodule in the left upper part of the prostate the patient is aware of but has had for years.  His most recent PSA done last month was 2.27.    Will treat for suspected prostatitis which in turn hopefully will resolve the blood in the semen I also started patient on tamsulosin.

## 2024-04-24 ENCOUNTER — OFFICE VISIT (OUTPATIENT)
Dept: UROLOGY | Facility: CLINIC | Age: 79
End: 2024-04-24
Payer: MEDICARE

## 2024-04-24 ENCOUNTER — PATIENT ROUNDING (BHMG ONLY) (OUTPATIENT)
Dept: UROLOGY | Facility: CLINIC | Age: 79
End: 2024-04-24
Payer: MEDICARE

## 2024-04-24 VITALS — WEIGHT: 192.4 LBS | HEIGHT: 72 IN | TEMPERATURE: 97.6 F | BODY MASS INDEX: 26.06 KG/M2

## 2024-04-24 DIAGNOSIS — N41.9 PROSTATITIS, UNSPECIFIED PROSTATITIS TYPE: Primary | ICD-10-CM

## 2024-04-24 DIAGNOSIS — R36.1 HEMATOSPERMIA: ICD-10-CM

## 2024-04-24 LAB
BILIRUB BLD-MCNC: NEGATIVE MG/DL
CLARITY, POC: CLEAR
COLOR UR: YELLOW
GLUCOSE UR STRIP-MCNC: NEGATIVE MG/DL
KETONES UR QL: NEGATIVE
LEUKOCYTE EST, POC: NEGATIVE
NITRITE UR-MCNC: NEGATIVE MG/ML
PH UR: 5.5 [PH] (ref 5–8)
PROT UR STRIP-MCNC: NEGATIVE MG/DL
RBC # UR STRIP: NEGATIVE /UL
SP GR UR: 1.02 (ref 1–1.03)
UROBILINOGEN UR QL: NORMAL

## 2024-04-24 RX ORDER — TAMSULOSIN HYDROCHLORIDE 0.4 MG/1
1 CAPSULE ORAL NIGHTLY
Qty: 30 CAPSULE | Refills: 2 | Status: SHIPPED | OUTPATIENT
Start: 2024-04-24 | End: 2024-07-23

## 2024-04-24 RX ORDER — SULFAMETHOXAZOLE AND TRIMETHOPRIM 800; 160 MG/1; MG/1
1 TABLET ORAL 2 TIMES DAILY
Qty: 56 TABLET | Refills: 0 | Status: SHIPPED | OUTPATIENT
Start: 2024-04-24 | End: 2024-05-22

## 2024-04-24 RX ORDER — NITROGLYCERIN 0.4 MG/1
TABLET SUBLINGUAL
COMMUNITY

## 2024-04-24 RX ORDER — METOPROLOL SUCCINATE 25 MG/1
TABLET, EXTENDED RELEASE ORAL
COMMUNITY

## 2024-04-24 NOTE — PROGRESS NOTES
April 24, 2024    Hello, may I speak with Joe Lazar?    My name is Pippa      I am  with [facility]  [address], [address]  [place] [address] [phone].    Before we get started may I verify your date of birth? 1945    I am calling to officially welcome you to our practice and ask about your recent visit. Is this a good time to talk? yes    Tell me about your visit with us. What things went well?  Yes, it was a good visit.  He's a good tameka.       We're always looking for ways to make our patients' experiences even better. Do you have recommendations on ways we may improve?  no    Overall were you satisfied with your first visit to our practice? yes       I appreciate you taking the time to speak with me today. Is there anything else I can do for you? no      Thank you, and have a great day.

## 2024-05-09 NOTE — PROGRESS NOTES
Subjective    Mr. Lazar is 78 y.o. male    Chief Complaint: Prostatitis    History of Present Illness  Patient presents for 1 month follow-up.  Has had episodes of blood in the semen however also urinary symptoms are not improved.      The following portions of the patient's history were reviewed and updated as appropriate: allergies, current medications, past family history, past medical history, past social history, past surgical history and problem list.    Review of Systems   Genitourinary:  Positive for urgency.         Current Outpatient Medications:     apixaban (Eliquis) 5 MG tablet tablet, Every 12 (Twelve) Hours., Disp: , Rfl:     aspirin 81 MG EC tablet, Take 1 tablet by mouth Daily., Disp: , Rfl:     ferrous sulfate 325 (65 FE) MG tablet, Take 1 tablet by mouth Daily With Breakfast., Disp: , Rfl:     lisinopril (PRINIVIL,ZESTRIL) 10 MG tablet, Take 1 tablet by mouth Daily., Disp: , Rfl:     meloxicam (MOBIC) 15 MG tablet, Take 1 tablet by mouth Daily., Disp: , Rfl:     metoprolol succinate XL (TOPROL-XL) 25 MG 24 hr tablet, metoprolol succinate ER 25 mg tablet,extended release 24 hr  TAKE 1 TABLET BY MOUTH EVERY DAY, Disp: , Rfl:     multivitamin with minerals (CENTRUM SILVER 50+MEN PO), Take 1 tablet by mouth Daily., Disp: , Rfl:     nitroglycerin (NITROSTAT) 0.4 MG SL tablet, nitroglycerin 0.4 mg sublingual tablet  PLACE 1 TABLET UNDER TONGUE EVERY 5 MINS, UP TO 3 DOSES AS NEEDED FOR CHEST PAIN, Disp: , Rfl:     omeprazole (priLOSEC) 20 MG capsule, Take 1 capsule by mouth Daily., Disp: , Rfl:     allopurinol (ZYLOPRIM) 100 MG tablet, Take 1 tablet by mouth Daily. (Patient not taking: Reported on 5/22/2024), Disp: , Rfl:     azithromycin (ZITHROMAX) 250 MG tablet, Take 1 tablet by mouth Daily. (Patient not taking: Reported on 4/24/2024), Disp: 6 tablet, Rfl: 0    clopidogrel (PLAVIX) 75 MG tablet, Take 75 mg by mouth Daily. (Patient not taking: Reported on 4/24/2024), Disp: , Rfl:     doxycycline  "(MONODOX) 100 MG capsule, Take 1 capsule by mouth 2 (Two) Times a Day. (Patient not taking: Reported on 4/24/2024), Disp: 14 capsule, Rfl: 0    metoprolol tartrate (LOPRESSOR) 25 MG tablet, Take 25 mg by mouth 2 (Two) Times a Day. (Patient not taking: Reported on 4/24/2024), Disp: , Rfl:     saccharomyces boulardii (FLORASTOR) 250 MG capsule, Take 1 capsule by mouth 2 (Two) Times a Day. (Patient not taking: Reported on 4/24/2024), Disp: 14 capsule, Rfl: 0    sulfamethoxazole-trimethoprim (Bactrim DS) 800-160 MG per tablet, Take 1 tablet by mouth 2 (Two) Times a Day for 28 days. (Patient not taking: Reported on 5/22/2024), Disp: 56 tablet, Rfl: 0    tamsulosin (FLOMAX) 0.4 MG capsule 24 hr capsule, Take 1 capsule by mouth Every Night for 90 days. (Patient not taking: Reported on 5/22/2024), Disp: 30 capsule, Rfl: 2    Past Medical History:   Diagnosis Date    Coronary artery disease     GERD (gastroesophageal reflux disease)     Hypertension     Renal disorder        Past Surgical History:   Procedure Laterality Date    BACK SURGERY      CHOLECYSTECTOMY      CORONARY STENT PLACEMENT      X3    REPLACEMENT TOTAL KNEE Left        Social History     Socioeconomic History    Marital status:    Tobacco Use    Smoking status: Former    Smokeless tobacco: Former     Types: Chew   Vaping Use    Vaping status: Never Used   Substance and Sexual Activity    Alcohol use: Never    Sexual activity: Defer       History reviewed. No pertinent family history.    Objective    Temp 97.4 °F (36.3 °C)   Ht 182.9 cm (72\")   Wt 86.2 kg (190 lb)   BMI 25.77 kg/m²     Physical Exam  Vitals reviewed.   Constitutional:       Appearance: Normal appearance.   HENT:      Head: Normocephalic and atraumatic.   Pulmonary:      Effort: Pulmonary effort is normal.   Skin:     Coloration: Skin is not pale.   Neurological:      Mental Status: He is alert.   Psychiatric:         Mood and Affect: Mood normal.         Behavior: Behavior normal. "             Results for orders placed or performed in visit on 05/22/24   POC Urinalysis Dipstick, Multipro    Specimen: Urine   Result Value Ref Range    Color Dark Yellow Yellow, Straw, Dark Yellow, Maegan    Clarity, UA Clear Clear    Glucose, UA Negative Negative mg/dL    Bilirubin Negative Negative    Ketones, UA Negative Negative    Specific Gravity  1.030 1.005 - 1.030    Blood, UA Negative Negative    pH, Urine 5.5 5.0 - 8.0    Protein, POC Negative Negative mg/dL    Urobilinogen, UA 0.2 E.U./dL Normal, 0.2 E.U./dL    Nitrite, UA Negative Negative    Leukocytes Negative Negative     Assessment and Plan    Diagnoses and all orders for this visit:    1. Prostatitis, unspecified prostatitis type (Primary)  -     POC Urinalysis Dipstick, Multipro    2. Hematospermia    Patient I saw last month for episodes of hematospermia I treated him with Flomax and antibiotic.  He has had no improvement in symptoms on tamsulosin.  He understands the benign nature of blood in the semen.    However given his continued voiding symptoms and complaints I feel he needs further evaluation of his lower urinary tract with cystoscopy.  I explained that the cystoscopy could consist of putting a flexible scope up the penis through the urethral meatus he does have a lidocaine gel inserted prior to the cystoscopy but it can be uncomfortable.  He will then traverse the scope through the prostate into the bladder.

## 2024-05-22 ENCOUNTER — OFFICE VISIT (OUTPATIENT)
Dept: UROLOGY | Facility: CLINIC | Age: 79
End: 2024-05-22
Payer: MEDICARE

## 2024-05-22 VITALS — WEIGHT: 190 LBS | BODY MASS INDEX: 25.73 KG/M2 | HEIGHT: 72 IN | TEMPERATURE: 97.4 F

## 2024-05-22 DIAGNOSIS — R36.1 HEMATOSPERMIA: ICD-10-CM

## 2024-05-22 DIAGNOSIS — N41.9 PROSTATITIS, UNSPECIFIED PROSTATITIS TYPE: Primary | ICD-10-CM

## 2024-05-22 DIAGNOSIS — R35.0 FREQUENCY OF URINATION: ICD-10-CM

## 2024-05-22 LAB
BILIRUB BLD-MCNC: NEGATIVE MG/DL
CLARITY, POC: CLEAR
COLOR UR: NORMAL
GLUCOSE UR STRIP-MCNC: NEGATIVE MG/DL
KETONES UR QL: NEGATIVE
LEUKOCYTE EST, POC: NEGATIVE
NITRITE UR-MCNC: NEGATIVE MG/ML
PH UR: 5.5 [PH] (ref 5–8)
PROT UR STRIP-MCNC: NEGATIVE MG/DL
RBC # UR STRIP: NEGATIVE /UL
SP GR UR: 1.03 (ref 1–1.03)
UROBILINOGEN UR QL: NORMAL

## 2024-06-07 ENCOUNTER — PROCEDURE VISIT (OUTPATIENT)
Dept: UROLOGY | Facility: CLINIC | Age: 79
End: 2024-06-07
Payer: MEDICARE

## 2024-06-07 DIAGNOSIS — N40.1 BPH WITH URINARY OBSTRUCTION: ICD-10-CM

## 2024-06-07 DIAGNOSIS — N13.8 BPH WITH URINARY OBSTRUCTION: ICD-10-CM

## 2024-06-07 DIAGNOSIS — R39.9 LOWER URINARY TRACT SYMPTOMS (LUTS): Primary | ICD-10-CM

## 2024-06-07 RX ORDER — FINASTERIDE 5 MG/1
5 TABLET, FILM COATED ORAL DAILY
Qty: 90 TABLET | Refills: 3 | Status: SHIPPED | OUTPATIENT
Start: 2024-06-07

## 2024-06-07 NOTE — PROGRESS NOTES
Subjective    Mr. Lazar is 78 y.o. male    Chief Complaint: Hematospermia/LUTS    History of Present Illness  Patient with recurrent hematospermia as well as lower urinary tract symptoms.  He is intolerant of alpha-blocker therapy.  He presents today for cystoscopic examination.  He reports weak stream and a worsening of his urinary symptoms.  His most bothersome complaint is nocturia 2-3 times.    The following portions of the patient's history were reviewed and updated as appropriate: allergies, current medications, past family history, past medical history, past social history, past surgical history and problem list.    Review of Systems      Current Outpatient Medications:     allopurinol (ZYLOPRIM) 100 MG tablet, Take 1 tablet by mouth Daily. (Patient not taking: Reported on 5/22/2024), Disp: , Rfl:     apixaban (Eliquis) 5 MG tablet tablet, Every 12 (Twelve) Hours., Disp: , Rfl:     aspirin 81 MG EC tablet, Take 1 tablet by mouth Daily., Disp: , Rfl:     azithromycin (ZITHROMAX) 250 MG tablet, Take 1 tablet by mouth Daily. (Patient not taking: Reported on 4/24/2024), Disp: 6 tablet, Rfl: 0    clopidogrel (PLAVIX) 75 MG tablet, Take 75 mg by mouth Daily. (Patient not taking: Reported on 4/24/2024), Disp: , Rfl:     doxycycline (MONODOX) 100 MG capsule, Take 1 capsule by mouth 2 (Two) Times a Day. (Patient not taking: Reported on 4/24/2024), Disp: 14 capsule, Rfl: 0    ferrous sulfate 325 (65 FE) MG tablet, Take 1 tablet by mouth Daily With Breakfast., Disp: , Rfl:     lisinopril (PRINIVIL,ZESTRIL) 10 MG tablet, Take 1 tablet by mouth Daily., Disp: , Rfl:     meloxicam (MOBIC) 15 MG tablet, Take 1 tablet by mouth Daily., Disp: , Rfl:     metoprolol succinate XL (TOPROL-XL) 25 MG 24 hr tablet, metoprolol succinate ER 25 mg tablet,extended release 24 hr  TAKE 1 TABLET BY MOUTH EVERY DAY, Disp: , Rfl:     metoprolol tartrate (LOPRESSOR) 25 MG tablet, Take 25 mg by mouth 2 (Two) Times a Day. (Patient not taking:  Reported on 4/24/2024), Disp: , Rfl:     multivitamin with minerals (CENTRUM SILVER 50+MEN PO), Take 1 tablet by mouth Daily., Disp: , Rfl:     nitroglycerin (NITROSTAT) 0.4 MG SL tablet, nitroglycerin 0.4 mg sublingual tablet  PLACE 1 TABLET UNDER TONGUE EVERY 5 MINS, UP TO 3 DOSES AS NEEDED FOR CHEST PAIN, Disp: , Rfl:     omeprazole (priLOSEC) 20 MG capsule, Take 1 capsule by mouth Daily., Disp: , Rfl:     saccharomyces boulardii (FLORASTOR) 250 MG capsule, Take 1 capsule by mouth 2 (Two) Times a Day. (Patient not taking: Reported on 4/24/2024), Disp: 14 capsule, Rfl: 0    tamsulosin (FLOMAX) 0.4 MG capsule 24 hr capsule, Take 1 capsule by mouth Every Night for 90 days. (Patient not taking: Reported on 5/22/2024), Disp: 30 capsule, Rfl: 2    Past Medical History:   Diagnosis Date    Coronary artery disease     GERD (gastroesophageal reflux disease)     Hypertension     Renal disorder        Past Surgical History:   Procedure Laterality Date    BACK SURGERY      CHOLECYSTECTOMY      CORONARY STENT PLACEMENT      X3    REPLACEMENT TOTAL KNEE Left        Social History     Socioeconomic History    Marital status:    Tobacco Use    Smoking status: Former    Smokeless tobacco: Former     Types: Chew   Vaping Use    Vaping status: Never Used   Substance and Sexual Activity    Alcohol use: Never    Sexual activity: Defer       No family history on file.    Objective    There were no vitals taken for this visit.    Physical Exam        Results for orders placed or performed in visit on 06/07/24   POC Urinalysis Dipstick, Multipro    Specimen: Urine   Result Value Ref Range    Color Yellow Yellow, Straw, Dark Yellow, Maegan    Clarity, UA Clear Clear    Glucose, UA Negative Negative mg/dL    Bilirubin Negative Negative    Ketones, UA Negative Negative    Specific Gravity  1.020 1.005 - 1.030    Blood, UA Negative Negative    pH, Urine 5.5 5.0 - 8.0    Protein, POC Negative Negative mg/dL    Urobilinogen, UA Normal  Normal, 0.2 E.U./dL    Nitrite, UA Negative Negative    Leukocytes Negative Negative     Assessment and Plan    Diagnoses and all orders for this visit:    1. Lower urinary tract symptoms (LUTS) (Primary)  -     POC Urinalysis Dipstick, Multipro                Pre- operative diagnosis:  Lower urinary tract symptoms/Hematospermia    Post operative diagnosis:  Same    Procedure:  The patient was prepped and draped in a normal sterile fashion.  The urethra was anesthetized with 2% lidocaine jelly.  A flexible cystoscope was introduced per urethra.      Urethra:  Normal    Bladder:  moderate trabeculation and cellules    Ureteral orifices:  Normal position bilaterally    Prostate:  lateral lobe hypertrophy and median lobe hypertrophy    Patient tolerated the procedure well    Complications: none    Blood loss: minimal    Follow up:    Routine follow up     Discussed surgical procedures as well as medication with finasteride.  After thorough discussion of his options he is opted for finasteride.  He will follow-up with Sameer in 6 months.    Discussion of this resulted in significant evaluation and management in addition to the cystoscopy.

## 2024-07-18 LAB
25(OH)D3 SERPL-MCNC: 45.3 NG/ML
ALBUMIN SERPL-MCNC: 3.9 G/DL (ref 3.5–5.2)
ALP SERPL-CCNC: 106 U/L (ref 40–130)
ALT SERPL-CCNC: 14 U/L (ref 5–41)
ANION GAP SERPL CALCULATED.3IONS-SCNC: 13 MMOL/L (ref 7–19)
AST SERPL-CCNC: 20 U/L (ref 5–40)
BASOPHILS # BLD: 0.1 K/UL (ref 0–0.2)
BASOPHILS NFR BLD: 1.3 % (ref 0–1)
BILIRUB SERPL-MCNC: 0.3 MG/DL (ref 0.2–1.2)
BILIRUB UR QL STRIP: NEGATIVE
BUN SERPL-MCNC: 24 MG/DL (ref 8–23)
CALCIUM SERPL-MCNC: 9.4 MG/DL (ref 8.8–10.2)
CHLORIDE SERPL-SCNC: 109 MMOL/L (ref 98–111)
CLARITY UR: CLEAR
CO2 SERPL-SCNC: 20 MMOL/L (ref 22–29)
COLOR UR: YELLOW
CREAT SERPL-MCNC: 1.6 MG/DL (ref 0.5–1.2)
CREAT UR-MCNC: 171.6 MG/DL (ref 39–259)
EOSINOPHIL # BLD: 0.3 K/UL (ref 0–0.6)
EOSINOPHIL NFR BLD: 5.4 % (ref 0–5)
ERYTHROCYTE [DISTWIDTH] IN BLOOD BY AUTOMATED COUNT: 12.6 % (ref 11.5–14.5)
GLUCOSE SERPL-MCNC: 87 MG/DL (ref 74–109)
GLUCOSE UR STRIP.AUTO-MCNC: NEGATIVE MG/DL
HCT VFR BLD AUTO: 38.8 % (ref 42–52)
HGB BLD-MCNC: 12.6 G/DL (ref 14–18)
HGB UR STRIP.AUTO-MCNC: NEGATIVE MG/L
IMM GRANULOCYTES # BLD: 0 K/UL
KETONES UR STRIP.AUTO-MCNC: NEGATIVE MG/DL
LEUKOCYTE ESTERASE UR QL STRIP.AUTO: NEGATIVE
LYMPHOCYTES # BLD: 1.7 K/UL (ref 1.1–4.5)
LYMPHOCYTES NFR BLD: 32.3 % (ref 20–40)
MAGNESIUM SERPL-MCNC: 1.8 MG/DL (ref 1.6–2.4)
MCH RBC QN AUTO: 30 PG (ref 27–31)
MCHC RBC AUTO-ENTMCNC: 32.5 G/DL (ref 33–37)
MCV RBC AUTO: 92.4 FL (ref 80–94)
MONOCYTES # BLD: 0.5 K/UL (ref 0–0.9)
MONOCYTES NFR BLD: 9.5 % (ref 0–10)
NEUTROPHILS # BLD: 2.8 K/UL (ref 1.5–7.5)
NEUTS SEG NFR BLD: 51.1 % (ref 50–65)
NITRITE UR QL STRIP.AUTO: NEGATIVE
PH UR STRIP.AUTO: 5 [PH] (ref 5–8)
PHOSPHATE SERPL-MCNC: 2.6 MG/DL (ref 2.5–4.5)
PLATELET # BLD AUTO: 174 K/UL (ref 130–400)
PMV BLD AUTO: 10.3 FL (ref 9.4–12.4)
POTASSIUM SERPL-SCNC: 4.6 MMOL/L (ref 3.5–5)
PROT SERPL-MCNC: 6.5 G/DL (ref 6.6–8.7)
PROT UR STRIP.AUTO-MCNC: NEGATIVE MG/DL
PROT UR-MCNC: 9 MG/DL (ref 15–45)
PTH-INTACT SERPL-MCNC: 31.8 PG/ML (ref 15–65)
RBC # BLD AUTO: 4.2 M/UL (ref 4.7–6.1)
SODIUM SERPL-SCNC: 142 MMOL/L (ref 136–145)
SP GR UR STRIP.AUTO: 1.02 (ref 1–1.03)
URATE SERPL-MCNC: 6 MG/DL (ref 3.4–7)
UROBILINOGEN UR STRIP.AUTO-MCNC: 0.2 E.U./DL
WBC # BLD AUTO: 5.4 K/UL (ref 4.8–10.8)

## 2024-08-19 ENCOUNTER — TELEPHONE (OUTPATIENT)
Dept: UROLOGY | Facility: CLINIC | Age: 79
End: 2024-08-19
Payer: MEDICARE

## 2024-08-19 NOTE — TELEPHONE ENCOUNTER
Called pt- his symptoms include getting harder to urinate and it hurts when he urinates. I advised pt he needs appt. He v.u and is coming on Wednesday

## 2024-08-19 NOTE — TELEPHONE ENCOUNTER
Patient called with c.o prostatitis coming back. He is requesting a refill on bactrim. Please advise if pt needs appt

## 2024-08-20 NOTE — PROGRESS NOTES
Subjective    Mr. Lazar is 79 y.o. male    Chief Complaint: Prostatitis    History of Present Illness  Patient is a 79-year-old gentleman with previous history of lower urinary tract symptoms as well as history of prostatitis.  Last few days patient has developed worsening urgency frequency and a little bit of burning.  He denies any other symptoms such as fever chills nausea vomiting or flank pain.  Denies any perineal discomfort.  He states that this feels like previous prostatitis episodes.  His temp is normal today.  He cannot tolerate alpha blockers.  He did have a cystoscopy 06/07/2024 and he would be a surgical candidate these were discussed by Dr. Gallegos with the patient.  For now he just wants to leave things as they are.    The following portions of the patient's history were reviewed and updated as appropriate: allergies, current medications, past family history, past medical history, past social history, past surgical history and problem list.    Review of Systems   Genitourinary:  Positive for dysuria, frequency and urgency.         Current Outpatient Medications:     allopurinol (ZYLOPRIM) 100 MG tablet, Take 1 tablet by mouth Daily., Disp: , Rfl:     apixaban (Eliquis) 5 MG tablet tablet, Every 12 (Twelve) Hours., Disp: , Rfl:     aspirin 81 MG EC tablet, Take 1 tablet by mouth Daily., Disp: , Rfl:     azithromycin (ZITHROMAX) 250 MG tablet, Take 1 tablet by mouth Daily., Disp: 6 tablet, Rfl: 0    clopidogrel (PLAVIX) 75 MG tablet, Take 1 tablet by mouth Daily., Disp: , Rfl:     doxycycline (MONODOX) 100 MG capsule, Take 1 capsule by mouth 2 (Two) Times a Day., Disp: 14 capsule, Rfl: 0    ferrous sulfate 325 (65 FE) MG tablet, Take 1 tablet by mouth Daily With Breakfast., Disp: , Rfl:     finasteride (PROSCAR) 5 MG tablet, Take 1 tablet by mouth Daily., Disp: 90 tablet, Rfl: 3    lisinopril (PRINIVIL,ZESTRIL) 10 MG tablet, Take 1 tablet by mouth Daily., Disp: , Rfl:     meloxicam (MOBIC) 15 MG tablet,  "Take 1 tablet by mouth Daily., Disp: , Rfl:     metoprolol succinate XL (TOPROL-XL) 25 MG 24 hr tablet, metoprolol succinate ER 25 mg tablet,extended release 24 hr  TAKE 1 TABLET BY MOUTH EVERY DAY, Disp: , Rfl:     metoprolol tartrate (LOPRESSOR) 25 MG tablet, Take 1 tablet by mouth 2 (Two) Times a Day., Disp: , Rfl:     multivitamin with minerals (CENTRUM SILVER 50+MEN PO), Take 1 tablet by mouth Daily., Disp: , Rfl:     nitroglycerin (NITROSTAT) 0.4 MG SL tablet, nitroglycerin 0.4 mg sublingual tablet  PLACE 1 TABLET UNDER TONGUE EVERY 5 MINS, UP TO 3 DOSES AS NEEDED FOR CHEST PAIN, Disp: , Rfl:     omeprazole (priLOSEC) 20 MG capsule, Take 1 capsule by mouth Daily., Disp: , Rfl:     saccharomyces boulardii (FLORASTOR) 250 MG capsule, Take 1 capsule by mouth 2 (Two) Times a Day., Disp: 14 capsule, Rfl: 0    sulfamethoxazole-trimethoprim (Bactrim DS) 800-160 MG per tablet, Take 1 tablet by mouth 2 (Two) Times a Day for 28 days., Disp: 56 tablet, Rfl: 0    Past Medical History:   Diagnosis Date    Coronary artery disease     GERD (gastroesophageal reflux disease)     Hypertension     Renal disorder        Past Surgical History:   Procedure Laterality Date    BACK SURGERY      CHOLECYSTECTOMY      CORONARY STENT PLACEMENT      X3    REPLACEMENT TOTAL KNEE Left        Social History     Socioeconomic History    Marital status:    Tobacco Use    Smoking status: Former    Smokeless tobacco: Former     Types: Chew   Vaping Use    Vaping status: Never Used   Substance and Sexual Activity    Alcohol use: Never    Sexual activity: Defer       No family history on file.    Objective    Temp 97.5 °F (36.4 °C)   Ht 182.9 cm (72\")   Wt 85.3 kg (188 lb)   BMI 25.50 kg/m²     Physical Exam  Constitutional:       Appearance: Normal appearance.   HENT:      Head: Normocephalic and atraumatic.   Pulmonary:      Effort: Pulmonary effort is normal.   Skin:     Coloration: Skin is not pale.   Neurological:      Mental " Status: He is alert.   Psychiatric:         Mood and Affect: Mood normal.         Behavior: Behavior normal.             Results for orders placed or performed in visit on 08/21/24   POC Urinalysis Dipstick, Multipro    Specimen: Urine   Result Value Ref Range    Color Yellow Yellow, Straw, Dark Yellow, Maegan    Clarity, UA Clear Clear    Glucose, UA Negative Negative mg/dL    Bilirubin Negative Negative    Ketones, UA Negative Negative    Specific Gravity  1.025 1.005 - 1.030    Blood, UA Negative Negative    pH, Urine 6.5 5.0 - 8.0    Protein, POC Negative Negative mg/dL    Urobilinogen, UA Normal Normal, 0.2 E.U./dL    Nitrite, UA Negative Negative    Leukocytes Negative Negative     Assessment and Plan    Diagnoses and all orders for this visit:    1. Prostatitis, unspecified prostatitis type (Primary)  -     POC Urinalysis Dipstick, Multipro  -     sulfamethoxazole-trimethoprim (Bactrim DS) 800-160 MG per tablet; Take 1 tablet by mouth 2 (Two) Times a Day for 28 days.  Dispense: 56 tablet; Refill: 0      Symptoms suspicious for prostatitis went ahead and treated him with 20 Dorey course of Bactrim which she is taken in the past which was effective.    He can return as scheduled or as needed.

## 2024-08-21 ENCOUNTER — OFFICE VISIT (OUTPATIENT)
Dept: UROLOGY | Facility: CLINIC | Age: 79
End: 2024-08-21
Payer: MEDICARE

## 2024-08-21 VITALS — TEMPERATURE: 97.5 F | HEIGHT: 72 IN | BODY MASS INDEX: 25.47 KG/M2 | WEIGHT: 188 LBS

## 2024-08-21 DIAGNOSIS — N41.9 PROSTATITIS, UNSPECIFIED PROSTATITIS TYPE: Primary | ICD-10-CM

## 2024-08-21 LAB
BILIRUB BLD-MCNC: NEGATIVE MG/DL
CLARITY, POC: CLEAR
COLOR UR: YELLOW
GLUCOSE UR STRIP-MCNC: NEGATIVE MG/DL
KETONES UR QL: NEGATIVE
LEUKOCYTE EST, POC: NEGATIVE
NITRITE UR-MCNC: NEGATIVE MG/ML
PH UR: 6.5 [PH] (ref 5–8)
PROT UR STRIP-MCNC: NEGATIVE MG/DL
RBC # UR STRIP: NEGATIVE /UL
SP GR UR: 1.02 (ref 1–1.03)
UROBILINOGEN UR QL: NORMAL

## 2024-08-21 RX ORDER — SULFAMETHOXAZOLE AND TRIMETHOPRIM 800; 160 MG/1; MG/1
1 TABLET ORAL 2 TIMES DAILY
Qty: 56 TABLET | Refills: 0 | Status: SHIPPED | OUTPATIENT
Start: 2024-08-21 | End: 2024-09-18

## 2024-12-03 NOTE — PROGRESS NOTES
Subjective    Mr. Lazar is 79 y.o. male    Chief Complaint: Prostatitis     History of Present Illness  Patient with history of BPH and prostatitis recurrently from a BPH standpoint symptoms are about the same he is on finasteride he asked about whether he should continue and I said yes.  I also treated him with a 1 month course of Bactrim when I saw him 08/21/2024.  Now also taking cranberry pills and has noticed she has not had any UTI or UTI symptoms since taking that.  His urine is clear.  He did have a cystoscopy done 06/07/2024 for lower urinary tract symptoms he would be candidate for surgical options.  For now he does not want to consider surgery.    The following portions of the patient's history were reviewed and updated as appropriate: allergies, current medications, past family history, past medical history, past social history, past surgical history and problem list.    Review of Systems   Genitourinary:  Positive for difficulty urinating, dysuria, frequency and hematuria. Negative for decreased urine volume, flank pain, penile discharge, penile pain, penile swelling, scrotal swelling, testicular pain and urgency.         Current Outpatient Medications:     allopurinol (ZYLOPRIM) 100 MG tablet, Take 1 tablet by mouth Daily., Disp: , Rfl:     apixaban (Eliquis) 5 MG tablet tablet, Every 12 (Twelve) Hours., Disp: , Rfl:     aspirin 81 MG EC tablet, Take 1 tablet by mouth Daily., Disp: , Rfl:     ferrous sulfate 325 (65 FE) MG tablet, Take 1 tablet by mouth Daily With Breakfast., Disp: , Rfl:     finasteride (PROSCAR) 5 MG tablet, Take 1 tablet by mouth Daily., Disp: 90 tablet, Rfl: 3    lisinopril (PRINIVIL,ZESTRIL) 10 MG tablet, Take 1 tablet by mouth Daily., Disp: , Rfl:     meloxicam (MOBIC) 15 MG tablet, Take 1 tablet by mouth Daily., Disp: , Rfl:     metoprolol succinate XL (TOPROL-XL) 25 MG 24 hr tablet, metoprolol succinate ER 25 mg tablet,extended release 24 hr  TAKE 1 TABLET BY MOUTH EVERY DAY,  "Disp: , Rfl:     multivitamin with minerals (CENTRUM SILVER 50+MEN PO), Take 1 tablet by mouth Daily., Disp: , Rfl:     nitroglycerin (NITROSTAT) 0.4 MG SL tablet, nitroglycerin 0.4 mg sublingual tablet  PLACE 1 TABLET UNDER TONGUE EVERY 5 MINS, UP TO 3 DOSES AS NEEDED FOR CHEST PAIN, Disp: , Rfl:     omeprazole (priLOSEC) 40 MG capsule, Take 1 capsule by mouth Every Morning Before Breakfast., Disp: , Rfl:     saccharomyces boulardii (FLORASTOR) 250 MG capsule, Take 1 capsule by mouth 2 (Two) Times a Day., Disp: 14 capsule, Rfl: 0    azithromycin (ZITHROMAX) 250 MG tablet, Take 1 tablet by mouth Daily., Disp: 6 tablet, Rfl: 0    doxycycline (MONODOX) 100 MG capsule, Take 1 capsule by mouth 2 (Two) Times a Day., Disp: 14 capsule, Rfl: 0    metoprolol tartrate (LOPRESSOR) 25 MG tablet, Take 1 tablet by mouth 2 (Two) Times a Day., Disp: , Rfl:     Past Medical History:   Diagnosis Date    Coronary artery disease     GERD (gastroesophageal reflux disease)     Hypertension     Renal disorder        Past Surgical History:   Procedure Laterality Date    BACK SURGERY      CHOLECYSTECTOMY      CORONARY STENT PLACEMENT      X3    REPLACEMENT TOTAL KNEE Left        Social History     Socioeconomic History    Marital status:    Tobacco Use    Smoking status: Former    Smokeless tobacco: Former     Types: Chew   Vaping Use    Vaping status: Never Used   Substance and Sexual Activity    Alcohol use: Never    Sexual activity: Defer       No family history on file.    Objective    Temp 97.5 °F (36.4 °C)   Ht 182.9 cm (72\")   Wt 86.2 kg (190 lb)   BMI 25.77 kg/m²     Physical Exam  Vitals reviewed.   Constitutional:       Appearance: Normal appearance.   HENT:      Head: Normocephalic and atraumatic.   Pulmonary:      Effort: Pulmonary effort is normal.   Neurological:      Mental Status: He is alert.   Psychiatric:         Mood and Affect: Mood normal.         Behavior: Behavior normal.             Results for orders " placed or performed in visit on 12/09/24   POC Urinalysis Dipstick, Multipro    Collection Time: 12/09/24  1:11 PM    Specimen: Urine   Result Value Ref Range    Color Yellow Yellow, Straw, Dark Yellow, Maegan    Clarity, UA Clear Clear    Glucose, UA Negative Negative mg/dL    Bilirubin Negative Negative    Ketones, UA Negative Negative    Specific Gravity  1.025     Blood, UA Negative Negative    pH, Urine 5.5     Protein, POC Negative Negative mg/dL    Urobilinogen, UA 0.2 E.U./dL Normal, 0.2 E.U./dL    Nitrite, UA Negative Negative    Leukocytes Negative Negative     Assessment and Plan    Diagnoses and all orders for this visit:    1. Prostatitis, unspecified prostatitis type (Primary)  -     POC Urinalysis Dipstick, Multipro    2. BPH with urinary obstruction      No Current prostatitis symptoms his urine is clear.  He is taking cranberry pills and believes this has helped with preventing infections.    He will continue finasteride for now although symptoms are unchanged he does not want wish to consider surgery at this time.    Follow-up 6 months.

## 2024-12-09 ENCOUNTER — OFFICE VISIT (OUTPATIENT)
Dept: UROLOGY | Facility: CLINIC | Age: 79
End: 2024-12-09
Payer: MEDICARE

## 2024-12-09 VITALS — TEMPERATURE: 97.5 F | HEIGHT: 72 IN | WEIGHT: 190 LBS | BODY MASS INDEX: 25.73 KG/M2

## 2024-12-09 DIAGNOSIS — N41.9 PROSTATITIS, UNSPECIFIED PROSTATITIS TYPE: Primary | ICD-10-CM

## 2024-12-09 DIAGNOSIS — N13.8 BPH WITH URINARY OBSTRUCTION: ICD-10-CM

## 2024-12-09 DIAGNOSIS — N40.1 BPH WITH URINARY OBSTRUCTION: ICD-10-CM

## 2024-12-09 LAB
BILIRUB BLD-MCNC: NEGATIVE MG/DL
CLARITY, POC: CLEAR
COLOR UR: YELLOW
GLUCOSE UR STRIP-MCNC: NEGATIVE MG/DL
KETONES UR QL: NEGATIVE
LEUKOCYTE EST, POC: NEGATIVE
NITRITE UR-MCNC: NEGATIVE MG/ML
PH UR: 5.5 [PH]
PROT UR STRIP-MCNC: NEGATIVE MG/DL
RBC # UR STRIP: NEGATIVE /UL
SP GR UR: 1.02
UROBILINOGEN UR QL: ABNORMAL

## 2024-12-09 RX ORDER — OMEPRAZOLE 40 MG/1
40 CAPSULE, DELAYED RELEASE ORAL
COMMUNITY
Start: 2024-09-26

## 2025-06-12 NOTE — PROGRESS NOTES
Subjective    Mr. Lazar is 79 y.o. male    Chief Complaint: Prostatitis/BPH/LUTS    History of Present Illness  Patient with history of BPH and prostatitis in the past is here for 6-month follow-up.  He is on finasteride he had not taken for the past 2 weeks because he is run out and could not tell any difference.  He has not had any episodes of prostatitis or urinary tract infection since last seen.  He does take cranberry pills.  Urine is clear today.    The following portions of the patient's history were reviewed and updated as appropriate: allergies, current medications, past family history, past medical history, past social history, past surgical history and problem list.    Review of Systems   Genitourinary: Negative.          Current Outpatient Medications:     allopurinol (ZYLOPRIM) 100 MG tablet, Take 1 tablet by mouth Daily., Disp: , Rfl:     aspirin 81 MG EC tablet, Take 1 tablet by mouth Daily., Disp: , Rfl:     diclofenac (VOLTAREN) 75 MG EC tablet, Take 1 tablet by mouth Daily., Disp: , Rfl:     doxycycline (MONODOX) 100 MG capsule, Take 1 capsule by mouth 2 (Two) Times a Day., Disp: 14 capsule, Rfl: 0    famotidine (PEPCID) 20 MG tablet, TAKE 1 TABLET BY MOUTH TWICE A DAY WITH MEALS FOR 90 DAYS, Disp: , Rfl:     ferrous sulfate 325 (65 FE) MG tablet, Take 1 tablet by mouth Daily With Breakfast., Disp: , Rfl:     finasteride (PROSCAR) 5 MG tablet, Take 1 tablet by mouth Daily., Disp: 90 tablet, Rfl: 3    fluticasone (FLONASE) 50 MCG/ACT nasal spray, SPRAY 1 SPRAY INTO EACH NOSTRIL ONCE A DAY AS NEEDED, Disp: , Rfl:     Inclisiran Sodium (Leqvio) 284 MG/1.5ML solution prefilled syringe, Initial dose: Inject 284 mg/1.5 mL subcutaneous  3 months: Inject 284 mg/1.5 mL subcutaneous  Maintenance dose: Inject 284 mg/1.5 mL subcutaneous every 6 months up to 1 year., Disp: , Rfl:     lisinopril (PRINIVIL,ZESTRIL) 10 MG tablet, Take 1 tablet by mouth Daily., Disp: , Rfl:     metoprolol succinate XL (TOPROL-XL)  25 MG 24 hr tablet, metoprolol succinate ER 25 mg tablet,extended release 24 hr  TAKE 1 TABLET BY MOUTH EVERY DAY, Disp: , Rfl:     metoprolol tartrate (LOPRESSOR) 25 MG tablet, Take 1 tablet by mouth 2 (Two) Times a Day., Disp: , Rfl:     multivitamin with minerals (CENTRUM SILVER 50+MEN PO), Take 1 tablet by mouth Daily., Disp: , Rfl:     nitroglycerin (NITROSTAT) 0.4 MG SL tablet, nitroglycerin 0.4 mg sublingual tablet  PLACE 1 TABLET UNDER TONGUE EVERY 5 MINS, UP TO 3 DOSES AS NEEDED FOR CHEST PAIN, Disp: , Rfl:     saccharomyces boulardii (FLORASTOR) 250 MG capsule, Take 1 capsule by mouth 2 (Two) Times a Day., Disp: 14 capsule, Rfl: 0    apixaban (Eliquis) 5 MG tablet tablet, Every 12 (Twelve) Hours. (Patient not taking: Reported on 6/20/2025), Disp: , Rfl:     azithromycin (ZITHROMAX) 250 MG tablet, Take 1 tablet by mouth Daily. (Patient not taking: Reported on 6/20/2025), Disp: 6 tablet, Rfl: 0    finasteride (PROSCAR) 5 MG tablet, Take 1 tablet by mouth Daily for 360 days., Disp: 90 tablet, Rfl: 3    meloxicam (MOBIC) 15 MG tablet, Take 1 tablet by mouth Daily. (Patient not taking: Reported on 6/20/2025), Disp: , Rfl:     omeprazole (priLOSEC) 40 MG capsule, Take 1 capsule by mouth Every Morning Before Breakfast. (Patient not taking: Reported on 6/20/2025), Disp: , Rfl:     sucralfate (CARAFATE) 1 g tablet, Take 1 tablet by mouth 4 (Four) Times a Day. (Patient not taking: Reported on 6/20/2025), Disp: , Rfl:     Past Medical History:   Diagnosis Date    Coronary artery disease     GERD (gastroesophageal reflux disease)     Hypertension     Renal disorder        Past Surgical History:   Procedure Laterality Date    BACK SURGERY      CHOLECYSTECTOMY      CORONARY STENT PLACEMENT      X3    REPLACEMENT TOTAL KNEE Left        Social History     Socioeconomic History    Marital status:    Tobacco Use    Smoking status: Former    Smokeless tobacco: Former     Types: Chew   Vaping Use    Vaping status:  "Never Used   Substance and Sexual Activity    Alcohol use: Never    Sexual activity: Defer       History reviewed. No pertinent family history.    Objective    Temp 97.7 °F (36.5 °C)   Ht 182.9 cm (72.01\")   Wt 86.2 kg (190 lb)   BMI 25.76 kg/m²     Physical Exam  Constitutional:       Appearance: Normal appearance.   HENT:      Head: Normocephalic and atraumatic.   Pulmonary:      Effort: Pulmonary effort is normal.   Neurological:      Mental Status: He is alert.   Psychiatric:         Mood and Affect: Mood normal.         Behavior: Behavior normal.             Results for orders placed or performed in visit on 06/16/25   POC Urinalysis Dipstick, Multipro    Collection Time: 06/20/25  2:43 PM    Specimen: Urine   Result Value Ref Range    Color Yellow Yellow, Straw, Dark Yellow, Maegan    Clarity, UA Clear Clear    Glucose,  mg/dL (A) Negative mg/dL    Bilirubin Negative Negative    Ketones, UA Negative Negative    Specific Gravity  1.030 1.005 - 1.030    Blood, UA Negative Negative    pH, Urine 5.5 5.0 - 8.0    Protein, POC Negative Negative mg/dL    Urobilinogen, UA 0.2 E.U./dL Normal, 0.2 E.U./dL    Nitrite, UA Negative Negative    Leukocytes Negative Negative     Assessment and Plan    Diagnoses and all orders for this visit:    1. Prostatitis, unspecified prostatitis type (Primary)    2. BPH with urinary obstruction  -     finasteride (PROSCAR) 5 MG tablet; Take 1 tablet by mouth Daily for 360 days.  Dispense: 90 tablet; Refill: 3    3. Lower urinary tract symptoms (LUTS)      Overall symptoms are stable and controlled really does not wish any other treatment he had stopped finasteride for 2 weeks as he has run out we discussed whether he should stay on it or stop it I explained that because it has an effect of shrinking the prostate over time and takes 6 months to a year to reach full effect I would recommend just remaining on the finasteride.    His urine is clear he has had no urinary tract " infections or prostatitis since he was last seen.    Follow-up in 1 year.

## 2025-06-20 ENCOUNTER — OFFICE VISIT (OUTPATIENT)
Dept: UROLOGY | Facility: CLINIC | Age: 80
End: 2025-06-20
Payer: MEDICARE

## 2025-06-20 VITALS — BODY MASS INDEX: 25.73 KG/M2 | HEIGHT: 72 IN | TEMPERATURE: 97.7 F | WEIGHT: 190 LBS

## 2025-06-20 DIAGNOSIS — N40.1 BPH WITH URINARY OBSTRUCTION: ICD-10-CM

## 2025-06-20 DIAGNOSIS — N41.9 PROSTATITIS, UNSPECIFIED PROSTATITIS TYPE: Primary | ICD-10-CM

## 2025-06-20 DIAGNOSIS — N13.8 BPH WITH URINARY OBSTRUCTION: ICD-10-CM

## 2025-06-20 DIAGNOSIS — R39.9 LOWER URINARY TRACT SYMPTOMS (LUTS): ICD-10-CM

## 2025-06-20 RX ORDER — SUCRALFATE 1 G/1
1 TABLET ORAL 4 TIMES DAILY
COMMUNITY

## 2025-06-20 RX ORDER — INCLISIRAN 284 MG/1.5ML
INJECTION, SOLUTION SUBCUTANEOUS
COMMUNITY
Start: 2025-04-03

## 2025-06-20 RX ORDER — FLUTICASONE PROPIONATE 50 MCG
SPRAY, SUSPENSION (ML) NASAL
COMMUNITY

## 2025-06-20 RX ORDER — FINASTERIDE 5 MG/1
5 TABLET, FILM COATED ORAL DAILY
Qty: 90 TABLET | Refills: 3 | Status: SHIPPED | OUTPATIENT
Start: 2025-06-20 | End: 2026-06-15

## 2025-06-20 RX ORDER — DICLOFENAC SODIUM 75 MG/1
75 TABLET, DELAYED RELEASE ORAL DAILY
COMMUNITY

## 2025-06-20 RX ORDER — FAMOTIDINE 20 MG/1
TABLET, FILM COATED ORAL
COMMUNITY